# Patient Record
Sex: MALE | Race: WHITE | NOT HISPANIC OR LATINO | Employment: FULL TIME | URBAN - METROPOLITAN AREA
[De-identification: names, ages, dates, MRNs, and addresses within clinical notes are randomized per-mention and may not be internally consistent; named-entity substitution may affect disease eponyms.]

---

## 2024-05-01 ENCOUNTER — TELEPHONE (OUTPATIENT)
Dept: FAMILY MEDICINE CLINIC | Facility: CLINIC | Age: 44
End: 2024-05-01

## 2024-05-01 ENCOUNTER — OFFICE VISIT (OUTPATIENT)
Dept: FAMILY MEDICINE CLINIC | Facility: CLINIC | Age: 44
End: 2024-05-01
Payer: COMMERCIAL

## 2024-05-01 VITALS
BODY MASS INDEX: 31.62 KG/M2 | SYSTOLIC BLOOD PRESSURE: 111 MMHG | HEIGHT: 73 IN | WEIGHT: 238.6 LBS | DIASTOLIC BLOOD PRESSURE: 52 MMHG

## 2024-05-01 DIAGNOSIS — L91.8 SKIN TAG: ICD-10-CM

## 2024-05-01 DIAGNOSIS — F17.210 CIGARETTE NICOTINE DEPENDENCE WITHOUT COMPLICATION: ICD-10-CM

## 2024-05-01 DIAGNOSIS — F33.9 EPISODE OF RECURRENT MAJOR DEPRESSIVE DISORDER, UNSPECIFIED DEPRESSION EPISODE SEVERITY: Primary | ICD-10-CM

## 2024-05-01 PROCEDURE — 99204 OFFICE O/P NEW MOD 45 MIN: CPT | Performed by: NURSE PRACTITIONER

## 2024-05-01 RX ORDER — BUPROPION HYDROCHLORIDE 300 MG/1
300 TABLET ORAL EVERY MORNING
COMMUNITY
Start: 2024-01-20 | End: 2024-05-01

## 2024-05-01 RX ORDER — PAROXETINE HYDROCHLORIDE 20 MG/1
40 TABLET, FILM COATED ORAL
COMMUNITY
Start: 2024-01-20 | End: 2024-05-01

## 2024-05-01 NOTE — ASSESSMENT & PLAN NOTE
After some discussion we have decided to trial Trintellix, side effects and administration of medication discussed, we will follow-up in 1 month for reevaluation.  We are placing referral to psychiatry, we discussed potential increase in suicidal thoughts with medication at length, patient will call/seek help immediately if this were to occur

## 2024-05-01 NOTE — ASSESSMENT & PLAN NOTE
Patient is a smoker, does want to quit eventually, would like to get depression under control first.  He will work on cutting back

## 2024-05-01 NOTE — PROGRESS NOTES
"Chief Complaint  Establish Care and Depression    SUBJECTIVE  Isai Carlos presents to [facility] establish care, and talk about depression     Pt states depression has been life long,   Was seeing psych and doing therapy, has been doing therapy off and on since middle school, had a rough childhood.     Pt states had a very rough last year. Lost his job, was homeless, had a break-up, really had a bad year.  About 5  or so months ago he was placed on Wellbutrin and Paxil- but has been off for about a month, was on it for about 4 months. Wellbutrin was very expensive and paxil caused undesirable sexual side effects     Now living with family in [facility] and is working again. Situation is much improved, but depression persists.  Patient states that he does occasionally have thoughts of \" it would be easier if I were just dead\" but adamantly denies any active suicidal thoughts, intent to harm self, or any history of such.  Patient states he has been fighting his depression most of his life and he has had these fleeting thoughts of things being easier if he were dead off and on his entire life    Patient reports he has tried and failed Lexapro, welbutrin, paxil, zoloft, states unsure of other medications he may have tried.  Pt states was in a study at [facility] at one time for drug resistant depression, turned out to be in the placebo group.       Pt states over the last year he has been working on weight loss- total has lost about 80lbs, would like to lose about 20 more, trying to get healthy      Patient reports he also has a multitude of skin tags in bilateral axilla, would like referral to dermatology to have these removed as well as a head to toe skin check if he has never really had 1.      History of Present Illness  History reviewed. No pertinent past medical history.   History reviewed. No pertinent family history.   History reviewed. No pertinent surgical history. " "    Current Outpatient Medications:     Vortioxetine HBr (Trintellix) 10 MG tablet tablet, Take 1 tablet by mouth Daily With Breakfast., Disp: 30 tablet, Rfl: 1    OBJECTIVE  Vital Signs:   /52 (BP Location: Left arm, Patient Position: Sitting, Cuff Size: Large Adult)   Ht 185.4 cm (73\")   Wt 108 kg (238 lb 9.6 oz)   BMI 31.48 kg/m²    Estimated body mass index is 31.48 kg/m² as calculated from the following:    Height as of this encounter: 185.4 cm (73\").    Weight as of this encounter: 108 kg (238 lb 9.6 oz).     Wt Readings from Last 3 Encounters:   05/01/24 108 kg (238 lb 9.6 oz)     BP Readings from Last 3 Encounters:   05/01/24 111/52       Physical Exam  Vitals reviewed.   Constitutional:       Appearance: Normal appearance. He is well-developed.   HENT:      Head: Normocephalic and atraumatic.      Right Ear: External ear normal.      Left Ear: External ear normal.   Eyes:      Conjunctiva/sclera: Conjunctivae normal.      Pupils: Pupils are equal, round, and reactive to light.   Cardiovascular:      Rate and Rhythm: Normal rate and regular rhythm.      Heart sounds: No murmur heard.     No friction rub. No gallop.   Pulmonary:      Effort: Pulmonary effort is normal.      Breath sounds: Normal breath sounds. No wheezing or rhonchi.   Skin:     General: Skin is warm and dry.      Comments: Multiple tenderness skin tags noted under bilateral axilla ranging from 2 to 4 mm in size   Neurological:      Mental Status: He is alert and oriented to person, place, and time.      Cranial Nerves: No cranial nerve deficit.   Psychiatric:         Mood and Affect: Mood and affect normal.         Behavior: Behavior normal.         Thought Content: Thought content normal.         Judgment: Judgment normal.          Result Review                                  No Images in the past 120 days found..     The above data has been reviewed by DIANA Mckeon 05/01/2024 08:41 EDT.          Patient Care " Team:  Cally Trent APRN as PCP - General (Nurse Practitioner)    BMI cannot be calculated due to outdated height or weight values.  Please input a current height/weight in Vitals and re-renter BMIFOLLOWUP in Note to pull in correct documentation based on BMI range.       ASSESSMENT & PLAN    Diagnoses and all orders for this visit:    1. Episode of recurrent major depressive disorder, unspecified depression episode severity (Primary)  Assessment & Plan:  After some discussion we have decided to trial Trintellix, side effects and administration of medication discussed, we will follow-up in 1 month for reevaluation.  We are placing referral to psychiatry, we discussed potential increase in suicidal thoughts with medication at length, patient will call/seek help immediately if this were to occur    Orders:  -     Vortioxetine HBr (Trintellix) 10 MG tablet tablet; Take 1 tablet by mouth Daily With Breakfast.  Dispense: 30 tablet; Refill: 1  -     Ambulatory Referral to Psychiatry    2. Cigarette nicotine dependence without complication  Assessment & Plan:  Patient is a smoker, does want to quit eventually, would like to get depression under control first.  He will work on cutting back      3. Skin tag  -     Ambulatory Referral to Dermatology         Tobacco Use: Not on file       Follow Up     Return in 4 weeks (on 5/29/2024), or if symptoms worsen or fail to improve.        Patient was given instructions and counseling regarding his condition or for health maintenance advice. Please see specific information pulled into the AVS if appropriate.   I have reviewed information obtained and documented by others and I have confirmed the accuracy of this documented note.    DIANA Mckeon

## 2024-05-09 RX ORDER — VENLAFAXINE HYDROCHLORIDE 75 MG/1
75 CAPSULE, EXTENDED RELEASE ORAL DAILY
Qty: 30 CAPSULE | Refills: 1 | Status: SHIPPED | OUTPATIENT
Start: 2024-05-09

## 2024-06-04 ENCOUNTER — OFFICE VISIT (OUTPATIENT)
Dept: FAMILY MEDICINE CLINIC | Facility: CLINIC | Age: 44
End: 2024-06-04
Payer: COMMERCIAL

## 2024-06-04 VITALS
SYSTOLIC BLOOD PRESSURE: 124 MMHG | HEIGHT: 73 IN | OXYGEN SATURATION: 100 % | HEART RATE: 90 BPM | DIASTOLIC BLOOD PRESSURE: 63 MMHG | WEIGHT: 231 LBS | BODY MASS INDEX: 30.62 KG/M2

## 2024-06-04 DIAGNOSIS — Z13.220 LIPID SCREENING: ICD-10-CM

## 2024-06-04 DIAGNOSIS — E07.89 THYROID FULLNESS: ICD-10-CM

## 2024-06-04 DIAGNOSIS — Z00.00 ANNUAL PHYSICAL EXAM: Primary | ICD-10-CM

## 2024-06-04 DIAGNOSIS — F33.9 EPISODE OF RECURRENT MAJOR DEPRESSIVE DISORDER, UNSPECIFIED DEPRESSION EPISODE SEVERITY: ICD-10-CM

## 2024-06-04 DIAGNOSIS — F17.210 CIGARETTE NICOTINE DEPENDENCE WITHOUT COMPLICATION: ICD-10-CM

## 2024-06-04 DIAGNOSIS — E66.9 CLASS 1 OBESITY WITHOUT SERIOUS COMORBIDITY WITH BODY MASS INDEX (BMI) OF 30.0 TO 30.9 IN ADULT, UNSPECIFIED OBESITY TYPE: ICD-10-CM

## 2024-06-04 DIAGNOSIS — Z13.29 THYROID DISORDER SCREEN: ICD-10-CM

## 2024-06-04 PROBLEM — E66.811 CLASS 1 OBESITY WITHOUT SERIOUS COMORBIDITY WITH BODY MASS INDEX (BMI) OF 30.0 TO 30.9 IN ADULT: Status: ACTIVE | Noted: 2024-06-04

## 2024-06-04 PROCEDURE — 99214 OFFICE O/P EST MOD 30 MIN: CPT | Performed by: NURSE PRACTITIONER

## 2024-06-04 PROCEDURE — 99396 PREV VISIT EST AGE 40-64: CPT | Performed by: NURSE PRACTITIONER

## 2024-06-04 RX ORDER — VENLAFAXINE HYDROCHLORIDE 75 MG/1
75 CAPSULE, EXTENDED RELEASE ORAL DAILY
Qty: 30 CAPSULE | Refills: 1 | Status: SHIPPED | OUTPATIENT
Start: 2024-06-04

## 2024-06-04 NOTE — ASSESSMENT & PLAN NOTE
Patient's (Body mass index is 30.48 kg/m².) indicates that they are obese (BMI >30) with health conditions that include none . Weight is improving with lifestyle modifications. BMI  is above average; BMI management plan is completed. We discussed portion control and increasing exercise.  Patient continues to lose weight, his overall goal is to lose 100 pounds, has lost around 80 thus far, congratulated patient on his amazing success and encouraged him to continue to work on diet and exercise changes and slow steady weight loss.

## 2024-06-04 NOTE — PROGRESS NOTES
"Chief Complaint  Depression, Anxiety, and Annual Exam    SUBJECTIVE  Isai Carlos presents to [facility] for one month follow up on depression and annual exam. Trintellix was too expensive so pt was switched to Effexor. Pt states he is doing ok but has had increased anxiety with it.  States that he can see some improvement in his depression symptoms, however he has also noticed a few days where his anxiety was more bothersome than typical.  States overall he would like to continue the medication for the time being.    Pt is scheduled to see Behavorial Health at the end of this month.     Patient is still smoking    History of Present Illness  Past Medical History:   Diagnosis Date    Anxiety     Depression       History reviewed. No pertinent family history.   History reviewed. No pertinent surgical history.     Current Outpatient Medications:     venlafaxine XR (Effexor XR) 75 MG 24 hr capsule, Take 1 capsule by mouth Daily., Disp: 30 capsule, Rfl: 1    Cariprazine HCl (Vraylar) 1.5 MG capsule capsule, Take 1 capsule by mouth Daily., Disp: 30 capsule, Rfl: 1    OBJECTIVE  Vital Signs:   /63   Pulse 90   Ht 185.4 cm (73\")   Wt 105 kg (231 lb)   SpO2 100%   BMI 30.48 kg/m²    Estimated body mass index is 30.48 kg/m² as calculated from the following:    Height as of this encounter: 185.4 cm (73\").    Weight as of this encounter: 105 kg (231 lb).     Wt Readings from Last 3 Encounters:   06/04/24 105 kg (231 lb)   05/01/24 108 kg (238 lb 9.6 oz)     BP Readings from Last 3 Encounters:   06/04/24 124/63   05/01/24 111/52       Physical Exam  Vitals reviewed.   Constitutional:       General: He is not in acute distress.     Appearance: Normal appearance. He is not diaphoretic.   HENT:      Head: Normocephalic and atraumatic. Hair is normal.      Right Ear: Hearing, tympanic membrane, ear canal and external ear normal.      Left Ear: Hearing, tympanic membrane, ear canal " and external ear normal.      Nose: Nose normal. No nasal deformity.      Mouth/Throat:      Mouth: Mucous membranes are moist. No oral lesions.      Pharynx: Uvula midline. No uvula swelling.   Eyes:      General: Lids are normal. No scleral icterus.        Right eye: No discharge.         Left eye: No discharge.      Extraocular Movements: Extraocular movements intact.      Right eye: Normal extraocular motion and no nystagmus.      Left eye: Normal extraocular motion and no nystagmus.      Conjunctiva/sclera: Conjunctivae normal.      Pupils: Pupils are equal, round, and reactive to light.   Neck:      Thyroid: No thyromegaly.      Vascular: No JVD.      Comments: Thyroid fullness noted  Cardiovascular:      Rate and Rhythm: Normal rate and regular rhythm.      Pulses: Normal pulses.      Heart sounds: Normal heart sounds. No murmur heard.     No gallop.   Pulmonary:      Effort: Pulmonary effort is normal. No respiratory distress.      Breath sounds: Normal breath sounds. No wheezing or rales.   Chest:      Chest wall: No tenderness.   Abdominal:      General: Bowel sounds are normal. There is no distension.      Palpations: Abdomen is soft. There is no mass.      Tenderness: There is no abdominal tenderness. There is no guarding.      Hernia: No hernia is present.   Musculoskeletal:         General: No tenderness or deformity. Normal range of motion.      Cervical back: Normal range of motion and neck supple.   Lymphadenopathy:      Cervical: No cervical adenopathy.   Skin:     General: Skin is warm and dry.      Findings: No rash.   Neurological:      Mental Status: He is alert and oriented to person, place, and time.      Cranial Nerves: No cranial nerve deficit.      Coordination: Coordination normal.      Deep Tendon Reflexes: Reflexes are normal and symmetric. Reflexes normal.   Psychiatric:         Mood and Affect: Mood normal.         Behavior: Behavior normal.         Thought Content: Thought content  normal.         Judgment: Judgment normal.          Result Review                    No Images in the past 120 days found..     The above data has been reviewed by DIANA Mckeon 06/04/2024 10:45 EDT.          Patient Care Team:  Cally Trent APRN as PCP - General (Nurse Practitioner)    BMI is >= 30 and <35. (Class 1 Obesity). The following options were offered after discussion;: exercise counseling/recommendations and nutrition counseling/recommendations       ASSESSMENT & PLAN    Diagnoses and all orders for this visit:    1. Annual physical exam (Primary)  -     Comprehensive Metabolic Panel; Future  -     CBC & Differential; Future  -     Lipid Panel; Future  -     TSH Rfx On Abnormal To Free T4; Future    2. Cigarette nicotine dependence without complication  Assessment & Plan:  Discussed need to quit, patient is aware of it but not ready at this time      3. Thyroid disorder screen  -     TSH Rfx On Abnormal To Free T4; Future    4. Lipid screening  -     Lipid Panel; Future    5. Thyroid fullness  Comments:  No known history of thyroid problems  Orders:  -     US Thyroid; Future    6. Episode of recurrent major depressive disorder, unspecified depression episode severity  Assessment & Plan:  Improving with Effexor but not well-controlled at this time, after further discussion of potentially switching medications, increasing dose, or trying in addition, patient has opted to trial the addition of Vraylar, side effects administration of medication discussed, patient has appointment at the end of this month with psychiatry, he will follow-up with them at that time.  Discussed with patient that if he is unable to keep this appointment he will call to let me know so that we may follow-up here on the new medication.  Patient verbalized understanding.    Orders:  -     Cariprazine HCl (Vraylar) 1.5 MG capsule capsule; Take 1 capsule by mouth Daily.  Dispense: 30 capsule; Refill: 1  -     venlafaxine  XR (Effexor XR) 75 MG 24 hr capsule; Take 1 capsule by mouth Daily.  Dispense: 30 capsule; Refill: 1    7. Class 1 obesity without serious comorbidity with body mass index (BMI) of 30.0 to 30.9 in adult, unspecified obesity type  Assessment & Plan:  Patient's (Body mass index is 30.48 kg/m².) indicates that they are obese (BMI >30) with health conditions that include none . Weight is improving with lifestyle modifications. BMI  is above average; BMI management plan is completed. We discussed portion control and increasing exercise.  Patient continues to lose weight, his overall goal is to lose 100 pounds, has lost around 80 thus far, congratulated patient on his amazing success and encouraged him to continue to work on diet and exercise changes and slow steady weight loss.           Tobacco Use: High Risk (6/4/2024)    Patient History     Smoking Tobacco Use: Every Day     Smokeless Tobacco Use: Never     Passive Exposure: Not on file       The patient is advised to continue to work on diet and exercise changes and weight loss, continue current medications, continue current healthy lifestyle patterns, and return for routine annual checkups.    Follow Up     Return in about 3 months (around 9/4/2024), or if symptoms worsen or fail to improve.        Patient was given instructions and counseling regarding his condition or for health maintenance advice. Please see specific information pulled into the AVS if appropriate.   I have reviewed information obtained and documented by others and I have confirmed the accuracy of this documented note.    DIANA Mckeon

## 2024-06-04 NOTE — ASSESSMENT & PLAN NOTE
Improving with Effexor but not well-controlled at this time, after further discussion of potentially switching medications, increasing dose, or trying in addition, patient has opted to trial the addition of Vraylar, side effects administration of medication discussed, patient has appointment at the end of this month with psychiatry, he will follow-up with them at that time.  Discussed with patient that if he is unable to keep this appointment he will call to let me know so that we may follow-up here on the new medication.  Patient verbalized understanding.

## 2024-06-06 ENCOUNTER — LAB (OUTPATIENT)
Dept: LAB | Facility: HOSPITAL | Age: 44
End: 2024-06-06
Payer: COMMERCIAL

## 2024-06-06 DIAGNOSIS — Z13.29 THYROID DISORDER SCREEN: ICD-10-CM

## 2024-06-06 DIAGNOSIS — Z13.220 LIPID SCREENING: ICD-10-CM

## 2024-06-06 DIAGNOSIS — Z00.00 ANNUAL PHYSICAL EXAM: ICD-10-CM

## 2024-06-06 LAB
ALBUMIN SERPL-MCNC: 4.3 G/DL (ref 3.5–5.2)
ALBUMIN/GLOB SERPL: 1.6 G/DL
ALP SERPL-CCNC: 52 U/L (ref 39–117)
ALT SERPL W P-5'-P-CCNC: 20 U/L (ref 1–41)
ANION GAP SERPL CALCULATED.3IONS-SCNC: 9.8 MMOL/L (ref 5–15)
AST SERPL-CCNC: 18 U/L (ref 1–40)
BASOPHILS # BLD AUTO: 0.06 10*3/MM3 (ref 0–0.2)
BASOPHILS NFR BLD AUTO: 0.9 % (ref 0–1.5)
BILIRUB SERPL-MCNC: 0.6 MG/DL (ref 0–1.2)
BUN SERPL-MCNC: 11 MG/DL (ref 6–20)
BUN/CREAT SERPL: 12.2 (ref 7–25)
CALCIUM SPEC-SCNC: 9.4 MG/DL (ref 8.6–10.5)
CHLORIDE SERPL-SCNC: 103 MMOL/L (ref 98–107)
CHOLEST SERPL-MCNC: 176 MG/DL (ref 0–200)
CO2 SERPL-SCNC: 27.2 MMOL/L (ref 22–29)
CREAT SERPL-MCNC: 0.9 MG/DL (ref 0.76–1.27)
DEPRECATED RDW RBC AUTO: 43.3 FL (ref 37–54)
EGFRCR SERPLBLD CKD-EPI 2021: 108 ML/MIN/1.73
EOSINOPHIL # BLD AUTO: 0.35 10*3/MM3 (ref 0–0.4)
EOSINOPHIL NFR BLD AUTO: 5.1 % (ref 0.3–6.2)
ERYTHROCYTE [DISTWIDTH] IN BLOOD BY AUTOMATED COUNT: 12.5 % (ref 12.3–15.4)
GLOBULIN UR ELPH-MCNC: 2.7 GM/DL
GLUCOSE SERPL-MCNC: 110 MG/DL (ref 65–99)
HCT VFR BLD AUTO: 47.1 % (ref 37.5–51)
HDLC SERPL-MCNC: 41 MG/DL (ref 40–60)
HGB BLD-MCNC: 15.5 G/DL (ref 13–17.7)
IMM GRANULOCYTES # BLD AUTO: 0.02 10*3/MM3 (ref 0–0.05)
IMM GRANULOCYTES NFR BLD AUTO: 0.3 % (ref 0–0.5)
LDLC SERPL CALC-MCNC: 114 MG/DL (ref 0–100)
LDLC/HDLC SERPL: 2.73 {RATIO}
LYMPHOCYTES # BLD AUTO: 2.28 10*3/MM3 (ref 0.7–3.1)
LYMPHOCYTES NFR BLD AUTO: 33.3 % (ref 19.6–45.3)
MCH RBC QN AUTO: 30.8 PG (ref 26.6–33)
MCHC RBC AUTO-ENTMCNC: 32.9 G/DL (ref 31.5–35.7)
MCV RBC AUTO: 93.6 FL (ref 79–97)
MONOCYTES # BLD AUTO: 0.59 10*3/MM3 (ref 0.1–0.9)
MONOCYTES NFR BLD AUTO: 8.6 % (ref 5–12)
NEUTROPHILS NFR BLD AUTO: 3.54 10*3/MM3 (ref 1.7–7)
NEUTROPHILS NFR BLD AUTO: 51.8 % (ref 42.7–76)
NRBC BLD AUTO-RTO: 0 /100 WBC (ref 0–0.2)
PLATELET # BLD AUTO: 175 10*3/MM3 (ref 140–450)
PMV BLD AUTO: 12.1 FL (ref 6–12)
POTASSIUM SERPL-SCNC: 4.5 MMOL/L (ref 3.5–5.2)
PROT SERPL-MCNC: 7 G/DL (ref 6–8.5)
RBC # BLD AUTO: 5.03 10*6/MM3 (ref 4.14–5.8)
SODIUM SERPL-SCNC: 140 MMOL/L (ref 136–145)
TRIGL SERPL-MCNC: 115 MG/DL (ref 0–150)
TSH SERPL DL<=0.05 MIU/L-ACNC: 0.52 UIU/ML (ref 0.27–4.2)
VLDLC SERPL-MCNC: 21 MG/DL (ref 5–40)
WBC NRBC COR # BLD AUTO: 6.84 10*3/MM3 (ref 3.4–10.8)

## 2024-06-06 PROCEDURE — 80050 GENERAL HEALTH PANEL: CPT

## 2024-06-06 PROCEDURE — 80061 LIPID PANEL: CPT

## 2024-06-06 PROCEDURE — 36415 COLL VENOUS BLD VENIPUNCTURE: CPT

## 2024-06-10 DIAGNOSIS — R73.09 ELEVATED GLUCOSE: Primary | ICD-10-CM

## 2024-07-08 DIAGNOSIS — E07.89 THYROID FULLNESS: ICD-10-CM

## 2024-07-17 ENCOUNTER — OFFICE VISIT (OUTPATIENT)
Dept: PSYCHIATRY | Facility: CLINIC | Age: 44
End: 2024-07-17
Payer: COMMERCIAL

## 2024-07-17 VITALS
DIASTOLIC BLOOD PRESSURE: 70 MMHG | HEIGHT: 73 IN | HEART RATE: 72 BPM | SYSTOLIC BLOOD PRESSURE: 138 MMHG | WEIGHT: 231 LBS | BODY MASS INDEX: 30.62 KG/M2

## 2024-07-17 DIAGNOSIS — F43.10 POST TRAUMATIC STRESS DISORDER (PTSD): Primary | ICD-10-CM

## 2024-07-17 DIAGNOSIS — Z73.1 ACCENTUATION OF PERSONALITY TRAITS: ICD-10-CM

## 2024-07-17 DIAGNOSIS — F39 MOOD DISORDER: ICD-10-CM

## 2024-07-17 DIAGNOSIS — F41.0 PANIC ATTACK: ICD-10-CM

## 2024-07-17 DIAGNOSIS — F43.10 COMPLEX POSTTRAUMATIC STRESS DISORDER: ICD-10-CM

## 2024-07-17 RX ORDER — HYDROXYZINE HYDROCHLORIDE 10 MG/1
10 TABLET, FILM COATED ORAL 3 TIMES DAILY PRN
Qty: 90 TABLET | Refills: 1 | Status: SHIPPED | OUTPATIENT
Start: 2024-07-17 | End: 2024-10-15

## 2024-07-17 RX ORDER — LAMOTRIGINE 25 MG/1
TABLET ORAL
Qty: 140 TABLET | Refills: 0 | Status: SHIPPED | OUTPATIENT
Start: 2024-07-17 | End: 2024-09-11

## 2024-07-17 NOTE — TREATMENT PLAN
Multi-Disciplinary Problems (from Behavioral Health Treatment Plan)      Active Problems       Problem: Anxiety  Start Date: 07/17/24      Problem Details: The patient self-scales this problem as a 2 with 10 being the worst.          Goal Priority Start Date Expected End Date End Date    Patient will develop and implement behavioral and cognitive strategies to reduce anxiety and irrational fears. -- 07/17/24 -- --    Goal Details: Progress toward goal:  The patient self-scales their progress related to this goal as a 2 with 10 being the worst.          Goal Intervention Frequency Start Date End Date    Help patient explore past emotional issues in relation to present anxiety. Weekly 07/17/24 --    Intervention Details: Duration of treatment until remission of symptoms.          Goal Intervention Frequency Start Date End Date    Help patient develop an awareness of their cognitive and physical responses to anxiety. Weekly 07/17/24 --    Intervention Details: Duration of treatment until remission of symptoms.                          Reviewed By       Deneen Garduno APRN 07/17/24 1911                     I have discussed and reviewed this treatment plan with the patient.  It has been printed for signatures.

## 2024-07-17 NOTE — PROGRESS NOTES
"[facility]  Initial Evaluation    Referring Provider:  Cally Trent, APRN  [address]  [address]  [place] [address]    Chief Complaint: \"last year has been one of the worst years I've ever had\"    History of Present Illness: Isai Carlos is a 44 y.o. male who presents today for initial evaluation regarding psychiatric consultation. Isai presents unaccompanied in no acute distress and engages with me appropriately. Psychotropic regimen with which patient presents is described as cariprazine 1.5 mg po q day, and venlafaxine XR 75 mg po q day. He has not noticed any major changes in mood, cariprazine rather new.     History is positive for signs/symptoms suggestive of history of significant trauma ( tours [place]  and [place])for which there are related intrusion symptoms related to the traumatic event (distressing memories, flashbacks, nightmares, intense distress associated with triggering stimuli, marked physiological reactions to triggering stimuli), persistent avoidance of triggering stimuli, negative alterations in cognition and mood (memory lapses, negative schemas, distorted cognitions about the event, social withdrawal, feelings of detachment/estrangement, persistent anhedonia), and marked alterations in arousal and reactivity (irritability, reckless behavior, hypervigilance, exaggerated startle, sleep disturbances).   Isai has a history of early exposure to enduring trauma associated with re-experiencing trauma, avoidance, hyperarousal (PTSD) and difficulty managing emotions, negative self-view, relationship difficulties, dissociative symptoms, and demoralization (complex PTSD).     He has a hx of low mood, low energy, anhedonia, changes in sleep, changes in appetite, guilt, poor concentration, psychomotor changes, thoughts of being better off dead     Psychiatric screening is negative for pathognomonic history of: psychosis, vivian, " ".    I have counseled the patient with regard to diagnoses and the recommended treatment regimen as documented below: I will assume prescriptive responsibility for Vraylar 1.5 mg po q day, Venlafaxine XR 75 mg po q day, Lamotrigine 25 mg po ramp up, and hydroxyzine 10 mg po TID anxiety. We will begin lamotrigine for mood instability and have reviewed the titration schedule as well as the signs and risk of SJS in addition to other more common SE including dizziness, sedation, and benign rash.      I will begin hydroxyzine PRN anxiety; patient has been advised this medication can elicit sedation and dizziness and should not be taken prior to driving or operating machinery Patient acknowledges the diagnoses per my rendered interpretation. Patient demonstrates awareness/understanding of viable alternatives for treatment as well as potential risks, benefits, and side effects associated with this regimen and is amenable to proceed in this fashion.     Recommended lifestyle changes: 30 minutes of activity to increase HR 2-3 days weekly.    Psychiatric History:  Diagnoses: depression  Outpatient history: was in therapy but lost insurance   Inpatient history: none  Medication trials: Trintellix, bupropion, effexor, paroxetine, vraylar  Other treatment modalities: Psychotherapy  Self harm: no  Suicide attempts: none, thoughts-persistent passive Si, \"I think no one would notice if I was not here\"  Abuse or neglect: emotional    Substance Abuse History:   Types/methods/frequency: *heavy drinking in college  Transtheoretical stage: no issues   Occasional cannabis  Social History:  Residence: lives house with godfather and his family  Vocation: yes  Source of income: selling [employer]  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  Caffeine intake: half a pot of coffee  Hydration habits: no pertinent issues    history: Yes, two tours     Social History     Socioeconomic History    Marital status: Single   Tobacco Use    Smoking status: Every Day     Current packs/day: 1.00     Average packs/day: 1 pack/day for 23.5 years (23.5 ttl pk-yrs)     Types: Cigarettes     Start date: 2001    Smokeless tobacco: Never   Vaping Use    Vaping status: Never Used   Substance and Sexual Activity    Alcohol use: Not Currently    Drug use: Yes     Types: Marijuana    Sexual activity: Not Currently     Access to Firearms: no    Tobacco use counseling/intervention: patient was counseled with regard to risks of tobacco use and encouraged to consider quitting, with or without the use of adjunctive medication, by first setting a prospective quit date. Currently Precontemplation by transtheoretical model.     PHQ-9 Depression Screening  PHQ-9 Total Score: 17    Little interest or pleasure in doing things? 2-->more than half the days   Feeling down, depressed, or hopeless? 3-->nearly every day   Trouble falling or staying asleep, or sleeping too much? 1-->several days   Feeling tired or having little energy? 2-->more than half the days   Poor appetite or overeating? 1-->several days   Feeling bad about yourself - or that you are a failure or have let yourself or your family down? 3-->nearly every day   Trouble concentrating on things, such as reading the newspaper or watching television? 2-->more than half the days   Moving or speaking so slowly that other people could have noticed? Or the opposite - being so fidgety or restless that you have been moving around a lot more than usual? 1-->several days   Thoughts that you would be better off dead, or of hurting yourself in some way? 2-->more than half the days   PHQ-9 Total Score 17     BETH-7  Feeling nervous,  anxious or on edge: More than half the days  Not being able to stop or control worrying: More than half the days  Worrying too much about different things: More than half the days  Trouble Relaxing: Several days  Being so restless that it is hard to sit still: Several days  Feeling afraid as if something awful might happen: More than half the days  Becoming easily annoyed or irritable: Several days  BETH 7 Total Score: 11  If you checked any problems, how difficult have these problems made it for you to do your work, take care of things at home, or get along with other people: Somewhat difficult    Problem List:  Patient Active Problem List   Diagnosis    Cigarette nicotine dependence without complication    Episode of recurrent major depressive disorder    Class 1 obesity without serious comorbidity with body mass index (BMI) of 30.0 to 30.9 in adult     Allergy:   No Known Allergies     Discontinued Medications:  There are no discontinued medications.    Current Medications:   Current Outpatient Medications   Medication Sig Dispense Refill    Cariprazine HCl (Vraylar) 1.5 MG capsule capsule Take 1 capsule by mouth Daily. 30 capsule 1    venlafaxine XR (Effexor XR) 75 MG 24 hr capsule Take 1 capsule by mouth Daily. 30 capsule 1    hydrOXYzine (ATARAX) 10 MG tablet Take 1 tablet by mouth 3 (Three) Times a Day As Needed for Anxiety (take on tablet as needed for anxiety) for up to 90 days. 90 tablet 1    lamoTRIgine (LaMICtal) 25 MG tablet Take 1 tablet by mouth Every Night for 14 days, THEN 2 tablets Every Night for 14 days, THEN 3 tablets Every Night for 14 days, THEN 4 tablets Every Night for 14 days. Call office 2-3 days before you run out 140 tablet 0     No current facility-administered medications for this visit.     Past Medical History:  Past Medical History:   Diagnosis Date    Anxiety     Depression     Panic disorder      Past Surgical History:  Past Surgical History:   Procedure Laterality Date    NO PAST  "SURGERIES       Family History:   Family History   Problem Relation Age of Onset    Suicide Attempts Mother     Paranoid behavior Mother     Drug abuse Mother     Depression Mother     Bipolar disorder Mother     Alcohol abuse Mother     Depression Sister     Dementia Maternal Grandmother     ADD / ADHD Cousin     ADD / ADHD Other        Mental Status Exam:   Observations:  Appearance: Neat  Speech: Normal  Eye Contact: Avoidant  Motor Activity: Normal  Affect:Full  Comments:  Mood:Mood: Euthymic  Cognition  Orientation Impairment: None  Memory Impairment: None  Attention: Normal  Comments:  Perception  Hallucinations:None  Other:   Comments:  Thoughts:  Suicidality:None  Homicidality:None  Delusions:  None  Comments:  Behavior:Behavior: Cooperative  Insight: Insight: Good  Judgement:Insight: Good    Vital Signs:   /70 Comment: pt reports drinking coffee prior to appt  Pulse 72   Ht 185.4 cm (73\")   Wt 105 kg (231 lb)   BMI 30.48 kg/m²    Lab Results:   Lab on 06/06/2024   Component Date Value Ref Range Status    Glucose 06/06/2024 110 (H)  65 - 99 mg/dL Final    BUN 06/06/2024 11  6 - 20 mg/dL Final    Creatinine 06/06/2024 0.90  0.76 - 1.27 mg/dL Final    Sodium 06/06/2024 140  136 - 145 mmol/L Final    Potassium 06/06/2024 4.5  3.5 - 5.2 mmol/L Final    Chloride 06/06/2024 103  98 - 107 mmol/L Final    CO2 06/06/2024 27.2  22.0 - 29.0 mmol/L Final    Calcium 06/06/2024 9.4  8.6 - 10.5 mg/dL Final    Total Protein 06/06/2024 7.0  6.0 - 8.5 g/dL Final    Albumin 06/06/2024 4.3  3.5 - 5.2 g/dL Final    ALT (SGPT) 06/06/2024 20  1 - 41 U/L Final    AST (SGOT) 06/06/2024 18  1 - 40 U/L Final    Alkaline Phosphatase 06/06/2024 52  39 - 117 U/L Final    Total Bilirubin 06/06/2024 0.6  0.0 - 1.2 mg/dL Final    Globulin 06/06/2024 2.7  gm/dL Final    A/G Ratio 06/06/2024 1.6  g/dL Final    BUN/Creatinine Ratio 06/06/2024 12.2  7.0 - 25.0 Final    Anion Gap 06/06/2024 9.8  5.0 - 15.0 mmol/L Final    eGFR " 06/06/2024 108.0  >60.0 mL/min/1.73 Final    Total Cholesterol 06/06/2024 176  0 - 200 mg/dL Final    Triglycerides 06/06/2024 115  0 - 150 mg/dL Final    HDL Cholesterol 06/06/2024 41  40 - 60 mg/dL Final    LDL Cholesterol  06/06/2024 114 (H)  0 - 100 mg/dL Final    VLDL Cholesterol 06/06/2024 21  5 - 40 mg/dL Final    LDL/HDL Ratio 06/06/2024 2.73   Final    TSH 06/06/2024 0.518  0.270 - 4.200 uIU/mL Final    WBC 06/06/2024 6.84  3.40 - 10.80 10*3/mm3 Final    RBC 06/06/2024 5.03  4.14 - 5.80 10*6/mm3 Final    Hemoglobin 06/06/2024 15.5  13.0 - 17.7 g/dL Final    Hematocrit 06/06/2024 47.1  37.5 - 51.0 % Final    MCV 06/06/2024 93.6  79.0 - 97.0 fL Final    MCH 06/06/2024 30.8  26.6 - 33.0 pg Final    MCHC 06/06/2024 32.9  31.5 - 35.7 g/dL Final    RDW 06/06/2024 12.5  12.3 - 15.4 % Final    RDW-SD 06/06/2024 43.3  37.0 - 54.0 fl Final    MPV 06/06/2024 12.1 (H)  6.0 - 12.0 fL Final    Platelets 06/06/2024 175  140 - 450 10*3/mm3 Final    Neutrophil % 06/06/2024 51.8  42.7 - 76.0 % Final    Lymphocyte % 06/06/2024 33.3  19.6 - 45.3 % Final    Monocyte % 06/06/2024 8.6  5.0 - 12.0 % Final    Eosinophil % 06/06/2024 5.1  0.3 - 6.2 % Final    Basophil % 06/06/2024 0.9  0.0 - 1.5 % Final    Immature Grans % 06/06/2024 0.3  0.0 - 0.5 % Final    Neutrophils, Absolute 06/06/2024 3.54  1.70 - 7.00 10*3/mm3 Final    Lymphocytes, Absolute 06/06/2024 2.28  0.70 - 3.10 10*3/mm3 Final    Monocytes, Absolute 06/06/2024 0.59  0.10 - 0.90 10*3/mm3 Final    Eosinophils, Absolute 06/06/2024 0.35  0.00 - 0.40 10*3/mm3 Final    Basophils, Absolute 06/06/2024 0.06  0.00 - 0.20 10*3/mm3 Final    Immature Grans, Absolute 06/06/2024 0.02  0.00 - 0.05 10*3/mm3 Final    nRBC 06/06/2024 0.0  0.0 - 0.2 /100 WBC Final       ASSESSMENT AND PLAN:    ICD-10-CM ICD-9-CM   1. Post traumatic stress disorder (PTSD)  F43.10 309.81   2. Panic attack  F41.0 300.01   3. Mood disorder  F39 296.90   4. Accentuation of personality traits  Z73.1 V69.8   5.  Complex posttraumatic stress disorder  F43.10 309.81       Isai who presents today for initial evaluation regarding medication consultation. We have discussed the history and interpreted diagnoses as above as well as the treatment plan below, including potential R/B/SE of the recommended regimen of which the patient demonstrates understanding. Patient is agreeable to call 911 or go to the nearest ER should he become concerned for his own safety and/or the safety of those around him. There are not overt indices of acute vivian/psychosis on evaluation today.     Medication regimen: Begin lamotrigine 25 mg ramp-up, begin hydroxyzine 10 mg p.o. 3 times daily for panic anxiety, continue cariprazine 1.5 mg p.o. daily, continue venlafaxine XR 75 mg p.o. daily patient is advised not to misuse prescribed medications or to use any exogenous substances that aren't disclosed to this provider as they may interact with the regimen to his detriment.   Risk Assessment: protracted risk is severe, imminent risk is moderate to severe.  Risk factors include: C-PTSD and possible related personality traits- note that patients diagnosed with certain personality disorder (to be determined) carry a protracted risk of suicide - whether this outcome would be intentional or an accidental progression of what was intended to be a gesture.  At each visit I will assess the patient's appreciable imminent risk, bearing in mind their protracted risk. Today the patient's imminent risk is moderate relative to the protracted risk intrinsic to their diagnosis- and no further safety measures other than review of safety plan to call office, go to ER, or call [phone].  are warranted at this time. Do note that I cannot predict the onset of new/exacerbated stressors for this patient and that measure of imminent risk is thereby subject to change rapidly in this cohort. Other risk factors are  anxiety disorder, mood disorder, and recent/ongoing psychosocial  stressors. Protective factors include: patient's cooperation with care. Do note that this is subject to change with the Catholic of new stressors, treatment non-adherence, use of substances, and/or new medical ails.  Monitoring: reviewed labs/imaging as populated above,  PHQ-9 today is PHQ-9 Total Score: 17 /27, BETH-7 today is 11/21  Therapy: will look into Eap through [facility]   Follow-up: one month  Communications: N/A    TREATMENT PLAN/GOALS: challenge patterns of living conducive to symptom burden, implement recommended regimen as above with augmentative, intermittent supportive psychotherapy to reduce symptom burden. Patient acknowledged and verbally consented to begin treatment as above. The importance of adherence to the recommended treatment and interval follow-up appointments was emphasized today. Patient was today advised to limit daily caffeine intake, hydrate appropriately, eat healthy and nutritious foods, engage sleep hygiene measures, engage appropriate exposure to sunlight, engage with hobbies in balance with life necessities, and exercise appropriate to their capacity to do so.     Billing: I have seen the patient today and considered his psychiatric complaints, rendered a diagnosis, and discussed treatment with the patient as above with which he consents.    Parts of this note are electronic transcriptions/translations of spoken language to printed text using the Dragon Dictation system.    Electronically signed by DIANA Jensen, 07/17/24,

## 2024-07-18 DIAGNOSIS — E04.1 THYROID NODULE: Primary | ICD-10-CM

## 2024-08-28 ENCOUNTER — OFFICE VISIT (OUTPATIENT)
Dept: PSYCHIATRY | Facility: CLINIC | Age: 44
End: 2024-08-28
Payer: COMMERCIAL

## 2024-08-28 VITALS
SYSTOLIC BLOOD PRESSURE: 122 MMHG | BODY MASS INDEX: 31.73 KG/M2 | DIASTOLIC BLOOD PRESSURE: 73 MMHG | HEART RATE: 78 BPM | HEIGHT: 73 IN | WEIGHT: 239.4 LBS

## 2024-08-28 DIAGNOSIS — F41.0 PANIC ATTACK: ICD-10-CM

## 2024-08-28 DIAGNOSIS — F43.10 COMPLEX POSTTRAUMATIC STRESS DISORDER: ICD-10-CM

## 2024-08-28 DIAGNOSIS — F33.1 MODERATE EPISODE OF RECURRENT MAJOR DEPRESSIVE DISORDER: ICD-10-CM

## 2024-08-28 DIAGNOSIS — F43.10 POST TRAUMATIC STRESS DISORDER (PTSD): Primary | ICD-10-CM

## 2024-08-28 DIAGNOSIS — Z73.1 ACCENTUATION OF PERSONALITY TRAITS: ICD-10-CM

## 2024-08-28 DIAGNOSIS — F33.9 EPISODE OF RECURRENT MAJOR DEPRESSIVE DISORDER, UNSPECIFIED DEPRESSION EPISODE SEVERITY: ICD-10-CM

## 2024-08-28 NOTE — PROGRESS NOTES
"[facility]  Follow-up Visit    Chief Complaint: \"last year has been one of the worst years I've ever had\"     History of Present Illness: Isai Carlos is a 44 y.o. male who presents today for initial evaluation regarding psychiatric consultation. Isai presents unaccompanied in no acute distress and engages with me appropriately. Psychotropic regimen with which patient presents is described as cariprazine 1.5 mg po q day, and venlafaxine XR 75 mg po q day. He has not noticed any major changes in mood, cariprazine rather new.      History is positive for signs/symptoms suggestive of history of significant trauma ( tours [place]  and [place])for which there are related intrusion symptoms related to the traumatic event (distressing memories, flashbacks, nightmares, intense distress associated with triggering stimuli, marked physiological reactions to triggering stimuli), persistent avoidance of triggering stimuli, negative alterations in cognition and mood (memory lapses, negative schemas, distorted cognitions about the event, social withdrawal, feelings of detachment/estrangement, persistent anhedonia), and marked alterations in arousal and reactivity (irritability, reckless behavior, hypervigilance, exaggerated startle, sleep disturbances).   Isai has a history of early exposure to enduring trauma associated with re-experiencing trauma, avoidance, hyperarousal (PTSD) and difficulty managing emotions, negative self-view, relationship difficulties, dissociative symptoms, and demoralization (complex PTSD).      He has a hx of low mood, low energy, anhedonia, changes in sleep, changes in appetite, guilt, poor concentration, psychomotor changes, thoughts of being better off dead      Psychiatric screening is negative for pathognomonic history of: psychosis, vivian, .     I have counseled the patient with regard to diagnoses and the recommended treatment regimen as " "documented below: I will assume prescriptive responsibility for Vraylar 1.5 mg po q day, Venlafaxine XR 75 mg po q day, Lamotrigine 25 mg po ramp up, and hydroxyzine 10 mg po TID anxiety. We will begin lamotrigine for mood instability and have reviewed the titration schedule as well as the signs and risk of SJS in addition to other more common SE including dizziness, sedation, and benign rash.       I will begin hydroxyzine PRN anxiety; patient has been advised this medication can elicit sedation and dizziness and should not be taken prior to driving or operating machinery Patient acknowledges the diagnoses per my rendered interpretation. Patient demonstrates awareness/understanding of viable alternatives for treatment as well as potential risks, benefits, and side effects associated with this regimen and is amenable to proceed in this fashion.      Recommended lifestyle changes: 30 minutes of activity to increase HR 2-3 days weekly.     Psychiatric History:  Diagnoses: depression  Outpatient history: was in therapy but lost insurance   Inpatient history: none  Medication trials: Trintellix, bupropion, effexor, paroxetine, vraylar  Other treatment modalities: Psychotherapy  Self harm: no  Suicide attempts: none, thoughts-persistent passive Si, \"I think no one would notice if I was not here\"  Abuse or neglect: emotional     Substance Abuse History:   Types/methods/frequency: *heavy drinking in college  Transtheoretical stage: no issues   Occasional cannabis  Social History:  Residence: lives house with godfather and his family  Vocation: yes  Source of income: selling mattresses  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  Caffeine intake: half a pot of coffee  Hydration habits: no pertinent issues    history: Yes, two tours        Interval History Isai is a 44 y.o. male who presents today for follow-up  Isai presents unaccompanied in no acute distress and engages with me appropriately. Isai reports his medication to not be working.     Current treatment regimen includes:   -Lamotrigine 100 mg po q HS  -hydroxyzine 10 mg po q TID  Venlafaxine XR 75 mg po q day   Side-effects per given history: none reported      Today the patient feels frustrated. He desires a referral to the [facility].  Thought process and content are devoid of overt aberration suggestive of acute vivian/psychosis. The patient denies SI/HI/AVH. There are not changes on exam today compared to most recent evaluation.    - sleep: problematic  - appetite: moderately controlled    I have counseled the patient with regard to diagnoses and the recommended treatment regimen as documented below. Patient acknowledges the diagnoses per my rendered interpretation. Patient demonstrates understanding of potential risks/benefits/side effects associated with this regimen and is amenable to proceed in this fashion.     Assignment: begin journaling instances of overwhelm, response thereto, ideal response to cultivate insight and begin breaking a pattern of stimulus/response.      Social History     Socioeconomic History    Marital status: Single   Tobacco Use    Smoking status: Every Day     Current packs/day: 1.00     Average packs/day: 1 pack/day for 23.7 years (23.7 ttl pk-yrs)     Types: Cigarettes     Start date: 2001    Smokeless tobacco: Never   Vaping Use    Vaping status: Never Used   Substance and Sexual Activity    Alcohol use: Not Currently    Drug use: Yes     Types: Marijuana    Sexual activity: Not Currently       Tobacco use counseling/intervention: patient does not use tobacco.  Problem List:  Patient Active Problem List    Diagnosis    Cigarette nicotine dependence without complication    Episode of recurrent major depressive disorder    Class 1 obesity without serious comorbidity with body mass index (BMI) of 30.0 to 30.9 in adult     Allergy:   No Known Allergies     Discontinued Medications:  Medications Discontinued During This Encounter   Medication Reason    Cariprazine HCl (Vraylar) 1.5 MG capsule capsule Reorder       Current Medications:   Current Outpatient Medications   Medication Sig Dispense Refill    Cariprazine HCl (Vraylar) 1.5 MG capsule capsule Take 1 capsule by mouth Daily for 28 days. 28 capsule 0    hydrOXYzine (ATARAX) 10 MG tablet Take 1 tablet by mouth 3 (Three) Times a Day As Needed for Anxiety (take on tablet as needed for anxiety) for up to 90 days. 90 tablet 1    lamoTRIgine (LaMICtal) 25 MG tablet Take 1 tablet by mouth Every Night for 14 days, THEN 2 tablets Every Night for 14 days, THEN 3 tablets Every Night for 14 days, THEN 4 tablets Every Night for 14 days. Call office 2-3 days before you run out 140 tablet 0    venlafaxine XR (Effexor XR) 75 MG 24 hr capsule Take 1 capsule by mouth Daily. 30 capsule 1    Cariprazine HCl (VRAYLAR) 1.5 MG capsule capsule Take 1 capsule by mouth Daily for 90 days. 30 capsule 2     No current facility-administered medications for this visit.     Past Medical History:  Past Medical History:   Diagnosis Date    Anxiety     Depression     Panic disorder      Past Surgical History:  Past Surgical History:   Procedure Laterality Date    NO PAST SURGERIES         MENTAL STATUS EXAM   General Appearance:  Cleanly groomed and dressed and well developed  Eye Contact:  Good eye contact  Attitude:  Cooperative, polite and candid  Motor Activity:  Normal gait, posture  Muscle Strength:  Normal  Speech:  Normal rate, tone, volume  Language:  Spontaneous  Mood and affect:  Normal, pleasant  Hopelessness:  Denies  Loneliness: Denies  Thought Process:  Logical  Associations/ Thought  "Content:  No delusions  Hallucinations:  None  Suicidal Ideations:  Not present  Homicidal Ideation:  Not present  Sensorium:  Alert  Orientation:  Person, place, time and situation  Immediate Recall, Recent, and Remote Memory:  Intact  Attention Span/ Concentration:  Good  Fund of Knowledge:  Appropriate for age and educational level  Intellectual Functioning:  Average range  Insight:  Good  Judgement:  Good  Reliability:  Good  Impulse Control:  Good      Vital Signs:   /73   Pulse 78   Ht 185.4 cm (73\")   Wt 109 kg (239 lb 6.4 oz)   BMI 31.59 kg/m²    Lab Results:   Lab on 06/06/2024   Component Date Value Ref Range Status    Glucose 06/06/2024 110 (H)  65 - 99 mg/dL Final    BUN 06/06/2024 11  6 - 20 mg/dL Final    Creatinine 06/06/2024 0.90  0.76 - 1.27 mg/dL Final    Sodium 06/06/2024 140  136 - 145 mmol/L Final    Potassium 06/06/2024 4.5  3.5 - 5.2 mmol/L Final    Chloride 06/06/2024 103  98 - 107 mmol/L Final    CO2 06/06/2024 27.2  22.0 - 29.0 mmol/L Final    Calcium 06/06/2024 9.4  8.6 - 10.5 mg/dL Final    Total Protein 06/06/2024 7.0  6.0 - 8.5 g/dL Final    Albumin 06/06/2024 4.3  3.5 - 5.2 g/dL Final    ALT (SGPT) 06/06/2024 20  1 - 41 U/L Final    AST (SGOT) 06/06/2024 18  1 - 40 U/L Final    Alkaline Phosphatase 06/06/2024 52  39 - 117 U/L Final    Total Bilirubin 06/06/2024 0.6  0.0 - 1.2 mg/dL Final    Globulin 06/06/2024 2.7  gm/dL Final    A/G Ratio 06/06/2024 1.6  g/dL Final    BUN/Creatinine Ratio 06/06/2024 12.2  7.0 - 25.0 Final    Anion Gap 06/06/2024 9.8  5.0 - 15.0 mmol/L Final    eGFR 06/06/2024 108.0  >60.0 mL/min/1.73 Final    Total Cholesterol 06/06/2024 176  0 - 200 mg/dL Final    Triglycerides 06/06/2024 115  0 - 150 mg/dL Final    HDL Cholesterol 06/06/2024 41  40 - 60 mg/dL Final    LDL Cholesterol  06/06/2024 114 (H)  0 - 100 mg/dL Final    VLDL Cholesterol 06/06/2024 21  5 - 40 mg/dL Final    LDL/HDL Ratio 06/06/2024 2.73   Final    TSH 06/06/2024 0.518  0.270 - 4.200 " uIU/mL Final    WBC 06/06/2024 6.84  3.40 - 10.80 10*3/mm3 Final    RBC 06/06/2024 5.03  4.14 - 5.80 10*6/mm3 Final    Hemoglobin 06/06/2024 15.5  13.0 - 17.7 g/dL Final    Hematocrit 06/06/2024 47.1  37.5 - 51.0 % Final    MCV 06/06/2024 93.6  79.0 - 97.0 fL Final    MCH 06/06/2024 30.8  26.6 - 33.0 pg Final    MCHC 06/06/2024 32.9  31.5 - 35.7 g/dL Final    RDW 06/06/2024 12.5  12.3 - 15.4 % Final    RDW-SD 06/06/2024 43.3  37.0 - 54.0 fl Final    MPV 06/06/2024 12.1 (H)  6.0 - 12.0 fL Final    Platelets 06/06/2024 175  140 - 450 10*3/mm3 Final    Neutrophil % 06/06/2024 51.8  42.7 - 76.0 % Final    Lymphocyte % 06/06/2024 33.3  19.6 - 45.3 % Final    Monocyte % 06/06/2024 8.6  5.0 - 12.0 % Final    Eosinophil % 06/06/2024 5.1  0.3 - 6.2 % Final    Basophil % 06/06/2024 0.9  0.0 - 1.5 % Final    Immature Grans % 06/06/2024 0.3  0.0 - 0.5 % Final    Neutrophils, Absolute 06/06/2024 3.54  1.70 - 7.00 10*3/mm3 Final    Lymphocytes, Absolute 06/06/2024 2.28  0.70 - 3.10 10*3/mm3 Final    Monocytes, Absolute 06/06/2024 0.59  0.10 - 0.90 10*3/mm3 Final    Eosinophils, Absolute 06/06/2024 0.35  0.00 - 0.40 10*3/mm3 Final    Basophils, Absolute 06/06/2024 0.06  0.00 - 0.20 10*3/mm3 Final    Immature Grans, Absolute 06/06/2024 0.02  0.00 - 0.05 10*3/mm3 Final    nRBC 06/06/2024 0.0  0.0 - 0.2 /100 WBC Final       ASSESSMENT AND PLAN:    ICD-10-CM ICD-9-CM   1. Post traumatic stress disorder (PTSD)  F43.10 309.81   2. Panic attack  F41.0 300.01   3. Accentuation of personality traits  Z73.1 V69.8   4. Complex posttraumatic stress disorder  F43.10 309.81   5. Moderate episode of recurrent major depressive disorder  F33.1 296.32   6. Episode of recurrent major depressive disorder, unspecified depression episode severity  F33.9 296.30       Isai is a 44 y.o. male who presents today for follow-up regarding medication management. We have discussed the interval history and the treatment plan below, including potential R/B/SE of  the recommended regimen of which the patient demonstrates understanding. Patient is agreeable to call 911 or go to the nearest ER should he become concerned for his own safety and/or the safety of those around him. There are are no overt indices of acute vivian/psychosis on exam today. EMBER reviewed and is as expected.    Medication regimen: cariprazine 1.5 mg po q day, hydroxyzine 10 mg po TID, lamotrigine 100 mg po q HS, venlafaxine 75 mg po q day; patient is advised not to misuse prescribed medications or to use them with any exogenous substances that aren't disclosed to this provider as they may interact with the regimen to the patient's detriment.   Risk Assessment: protracted risk is moderate, imminent risk is moderate.  Do note that this is subject to change with the Yarsani of new stressors, treatment non-adherence, use of substances, and/or new medical ails.   Monitoring: reviewed labs/imaging as populated above; ordered  Therapy: sees provider through [facility]  Follow-up: one month  Communications: N/A    TREATMENT PLAN/GOALS: challenge patterns of living conducive to symptom burden, implement recommended regimen as above with augmentative, intermittent supportive psychotherapy to reduce symptom burden. Patient acknowledged and verbally consented to continue treatment. The importance of adherence to the recommended treatment and interval follow-up appointments was again emphasized today: patient has good treatment adherence per given history. Patient was today reminded to limit daily caffeine intake, hydrate appropriately, eat healthy and nutritious foods, engage sleep hygiene measures, engage appropriate exposure to sunlight, engage with hobbies in balance with life necessities, and exercise appropriate to their capacity to do so.     Parts of this note are electronic transcriptions/translations of spoken language to printed text using the Dragon Dictation system.    Electronically signed by Deneen Garduno  APRN, 08/28/24

## 2024-09-17 ENCOUNTER — TELEPHONE (OUTPATIENT)
Dept: PSYCHIATRY | Facility: CLINIC | Age: 44
End: 2024-09-17
Payer: COMMERCIAL

## 2024-09-19 DIAGNOSIS — F39 MOOD DISORDER: ICD-10-CM

## 2024-09-19 RX ORDER — LAMOTRIGINE 100 MG/1
100 TABLET ORAL DAILY
Qty: 30 TABLET | Refills: 2 | Status: SHIPPED | OUTPATIENT
Start: 2024-09-19 | End: 2024-12-18

## 2024-09-19 RX ORDER — LAMOTRIGINE 25 MG/1
TABLET ORAL
Qty: 140 TABLET | Refills: 0 | Status: CANCELLED | OUTPATIENT
Start: 2024-09-19 | End: 2024-11-13

## 2024-09-22 DIAGNOSIS — F33.9 EPISODE OF RECURRENT MAJOR DEPRESSIVE DISORDER, UNSPECIFIED DEPRESSION EPISODE SEVERITY: ICD-10-CM

## 2024-09-25 RX ORDER — VENLAFAXINE HYDROCHLORIDE 75 MG/1
75 CAPSULE, EXTENDED RELEASE ORAL DAILY
Qty: 30 CAPSULE | Refills: 0 | Status: SHIPPED | OUTPATIENT
Start: 2024-09-25

## 2024-10-29 ENCOUNTER — OFFICE VISIT (OUTPATIENT)
Dept: PSYCHIATRY | Facility: CLINIC | Age: 44
End: 2024-10-29
Payer: COMMERCIAL

## 2024-10-29 VITALS
DIASTOLIC BLOOD PRESSURE: 77 MMHG | HEIGHT: 73 IN | BODY MASS INDEX: 33.27 KG/M2 | HEART RATE: 72 BPM | SYSTOLIC BLOOD PRESSURE: 120 MMHG | WEIGHT: 251 LBS | OXYGEN SATURATION: 98 %

## 2024-10-29 DIAGNOSIS — F39 MOOD DISORDER: Primary | ICD-10-CM

## 2024-10-29 DIAGNOSIS — F41.0 PANIC ATTACK: ICD-10-CM

## 2024-10-29 DIAGNOSIS — F33.9 EPISODE OF RECURRENT MAJOR DEPRESSIVE DISORDER, UNSPECIFIED DEPRESSION EPISODE SEVERITY: ICD-10-CM

## 2024-10-29 DIAGNOSIS — F43.10 POST TRAUMATIC STRESS DISORDER (PTSD): ICD-10-CM

## 2024-10-29 PROCEDURE — 99214 OFFICE O/P EST MOD 30 MIN: CPT

## 2024-10-29 RX ORDER — VENLAFAXINE HYDROCHLORIDE 75 MG/1
75 CAPSULE, EXTENDED RELEASE ORAL DAILY
Qty: 30 CAPSULE | Refills: 2 | Status: SHIPPED | OUTPATIENT
Start: 2024-10-29 | End: 2025-01-27

## 2024-10-29 RX ORDER — LAMOTRIGINE 25 MG/1
TABLET ORAL
Qty: 42 TABLET | Refills: 0 | Status: SHIPPED | OUTPATIENT
Start: 2024-10-29 | End: 2024-11-26

## 2024-10-29 NOTE — PROGRESS NOTES
"[facility]  Follow-up Visit    Chief Complaint: \"last year has been one of the worst years I've ever had\"     History of Present Illness: Isai Carlos is a 44 y.o. male who presents today for initial evaluation regarding psychiatric consultation. Isai presents unaccompanied in no acute distress and engages with me appropriately. Psychotropic regimen with which patient presents is described as cariprazine 1.5 mg po q day, and venlafaxine XR 75 mg po q day. He has not noticed any major changes in mood, cariprazine rather new.      History is positive for signs/symptoms suggestive of history of significant trauma ( tours [place]  and [place])for which there are related intrusion symptoms related to the traumatic event (distressing memories, flashbacks, nightmares, intense distress associated with triggering stimuli, marked physiological reactions to triggering stimuli), persistent avoidance of triggering stimuli, negative alterations in cognition and mood (memory lapses, negative schemas, distorted cognitions about the event, social withdrawal, feelings of detachment/estrangement, persistent anhedonia), and marked alterations in arousal and reactivity (irritability, reckless behavior, hypervigilance, exaggerated startle, sleep disturbances).   Isai has a history of early exposure to enduring trauma associated with re-experiencing trauma, avoidance, hyperarousal (PTSD) and difficulty managing emotions, negative self-view, relationship difficulties, dissociative symptoms, and demoralization (complex PTSD).      He has a hx of low mood, low energy, anhedonia, changes in sleep, changes in appetite, guilt, poor concentration, psychomotor changes, thoughts of being better off dead      Psychiatric screening is negative for pathognomonic history of: psychosis, vivian, .     I have counseled the patient with regard to diagnoses and the recommended treatment regimen as " "documented below: I will assume prescriptive responsibility for Vraylar 1.5 mg po q day, Venlafaxine XR 75 mg po q day, Lamotrigine 25 mg po ramp up, and hydroxyzine 10 mg po TID anxiety. We will begin lamotrigine for mood instability and have reviewed the titration schedule as well as the signs and risk of SJS in addition to other more common SE including dizziness, sedation, and benign rash.       I will begin hydroxyzine PRN anxiety; patient has been advised this medication can elicit sedation and dizziness and should not be taken prior to driving or operating machinery Patient acknowledges the diagnoses per my rendered interpretation. Patient demonstrates awareness/understanding of viable alternatives for treatment as well as potential risks, benefits, and side effects associated with this regimen and is amenable to proceed in this fashion.      Recommended lifestyle changes: 30 minutes of activity to increase HR 2-3 days weekly.     Psychiatric History:  Diagnoses: depression  Outpatient history: was in therapy but lost insurance   Inpatient history: none  Medication trials: Trintellix, bupropion, effexor, paroxetine, vraylar  Other treatment modalities: Psychotherapy  Self harm: no  Suicide attempts: none, thoughts-persistent passive Si, \"I think no one would notice if I was not here\"  Abuse or neglect: emotional     Substance Abuse History:   Types/methods/frequency: *heavy drinking in college  Transtheoretical stage: no issues   Occasional cannabis  Social History:  Residence: lives house with godfather and his family  Vocation: yes  Source of income: selling mattresses  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  Caffeine intake: half a pot of coffee  Hydration habits: no pertinent issues    history: Yes, two tours        Interval History Isai is a 44 y.o. male who presents today for follow-up regarding medication management . Last seen: 8/28/2024 at which time we added cariprazine 1.5 mg po q day, hydroxyzine 10 mg po q . Isai presents unaccompanied in no acute distress and engages with me appropriately.     Current treatment regimen includes:   - cariprazine 1.5 mg po q HS  -venlafaxine 75 mg po q HS  Lamotrigine 100 mg q HS  Hydroxyzine 10 mg po TID    Side-effects per given history: none.      Today the patient feels \"okay\" does not endorse feeling better or worse on current regimen. He did voice getting in touch with [facility] about es ketamine treatments. States that they are waiting to hear from his insurance. Isai states that it will likely be next year before he sees any one for es ketamine as there is a stringent schedule and transportation is an issue. Thought process and content are devoid of overt aberration suggestive of acute vivian/psychosis. The patient denies SI/HI/AVH. There are not changes on exam today compared to most recent evaluation.    - sleep: no concerns  - appetite: moderately controlled  With Isai's consent, will increase cariprazine to 3 mg po q HS, and lamotrigine to 125 mg po q 14 nights, then increase to 150 mg po q HS until next follow up. These increases are in effort to target mood lability. I have counseled the patient with regard to diagnoses and the recommended treatment regimen as documented below. Patient acknowledges the diagnoses per my rendered interpretation. Patient demonstrates understanding of potential risks/benefits/side effects associated with this regimen and is amenable to proceed in this fashion.     Assignment: recommend patient begin walking regimen around his neighborhood. 10-15 minutes a day for 2-3 days a week. Then increase " to 15-30 minutes a day for 4-5 days a week.     Social History     Socioeconomic History    Marital status: Single   Tobacco Use    Smoking status: Every Day     Current packs/day: 1.00     Average packs/day: 1 pack/day for 23.8 years (23.8 ttl pk-yrs)     Types: Cigarettes     Start date: 2001    Smokeless tobacco: Never   Vaping Use    Vaping status: Never Used   Substance and Sexual Activity    Alcohol use: Not Currently    Drug use: Yes     Types: Marijuana    Sexual activity: Not Currently       Tobacco use counseling/intervention: patient has been counseled with regard to risks of tobacco use and encouraged to consider quitting, with or without the use of adjunctive medication. Currently Precontemplation by transtheoretical model.     Problem List:  Patient Active Problem List   Diagnosis    Cigarette nicotine dependence without complication    Episode of recurrent major depressive disorder    Class 1 obesity without serious comorbidity with body mass index (BMI) of 30.0 to 30.9 in adult     Allergy:   No Known Allergies     Discontinued Medications:  Medications Discontinued During This Encounter   Medication Reason    Cariprazine HCl (VRAYLAR) 1.5 MG capsule capsule Dose adjustment    venlafaxine XR (EFFEXOR-XR) 75 MG 24 hr capsule        Current Medications:   Current Outpatient Medications   Medication Sig Dispense Refill    venlafaxine XR (EFFEXOR-XR) 75 MG 24 hr capsule Take 1 capsule by mouth Daily for 90 days. 30 capsule 2    Cariprazine HCl (VRAYLAR) 3 MG capsule capsule Take 1 capsule by mouth Every Night for 90 days. 30 capsule 2    lamoTRIgine (LaMICtal) 100 MG tablet Take 1 tablet by mouth Daily for 90 days. 30 tablet 2    lamoTRIgine (LaMICtal) 25 MG tablet Take 1 tablet by mouth Every Night for 14 days, THEN 2 tablets Every Night for 14 days. Call for refill 42 tablet 0     No current facility-administered medications for this visit.     Past Medical History:  Past Medical History:   Diagnosis  "Date    Anxiety     Depression     Panic disorder      Past Surgical History:  Past Surgical History:   Procedure Laterality Date    NO PAST SURGERIES         MENTAL STATUS EXAM   General Appearance:  Well developed and unkempt  Eye Contact:  Good eye contact  Attitude:  Cooperative, polite and candid  Motor Activity:  Normal gait, posture  Muscle Strength:  Normal  Speech:  Normal rate, tone, volume  Mood and affect:  Flat  Hopelessness:  Denies  Loneliness: Denies  Thought Process:  Logical  Associations/ Thought Content:  No delusions  Hallucinations:  None  Suicidal Ideations:  Not present  Homicidal Ideation:  Not present  Sensorium:  Alert and clear  Orientation:  Person, place, time and situation  Immediate Recall, Recent, and Remote Memory:  Intact  Attention Span/ Concentration:  Good  Fund of Knowledge:  Appropriate for age and educational level  Intellectual Functioning:  Average range  Insight:  Good  Judgement:  Good  Reliability:  Good  Impulse Control:  Good      Vital Signs:   /77   Pulse 72   Ht 185.4 cm (73\")   Wt 114 kg (251 lb)   SpO2 98%   BMI 33.12 kg/m²    Lab Results:   Lab on 06/06/2024   Component Date Value Ref Range Status    Glucose 06/06/2024 110 (H)  65 - 99 mg/dL Final    BUN 06/06/2024 11  6 - 20 mg/dL Final    Creatinine 06/06/2024 0.90  0.76 - 1.27 mg/dL Final    Sodium 06/06/2024 140  136 - 145 mmol/L Final    Potassium 06/06/2024 4.5  3.5 - 5.2 mmol/L Final    Chloride 06/06/2024 103  98 - 107 mmol/L Final    CO2 06/06/2024 27.2  22.0 - 29.0 mmol/L Final    Calcium 06/06/2024 9.4  8.6 - 10.5 mg/dL Final    Total Protein 06/06/2024 7.0  6.0 - 8.5 g/dL Final    Albumin 06/06/2024 4.3  3.5 - 5.2 g/dL Final    ALT (SGPT) 06/06/2024 20  1 - 41 U/L Final    AST (SGOT) 06/06/2024 18  1 - 40 U/L Final    Alkaline Phosphatase 06/06/2024 52  39 - 117 U/L Final    Total Bilirubin 06/06/2024 0.6  0.0 - 1.2 mg/dL Final    Globulin 06/06/2024 2.7  gm/dL Final    A/G Ratio 06/06/2024 " 1.6  g/dL Final    BUN/Creatinine Ratio 06/06/2024 12.2  7.0 - 25.0 Final    Anion Gap 06/06/2024 9.8  5.0 - 15.0 mmol/L Final    eGFR 06/06/2024 108.0  >60.0 mL/min/1.73 Final    Total Cholesterol 06/06/2024 176  0 - 200 mg/dL Final    Triglycerides 06/06/2024 115  0 - 150 mg/dL Final    HDL Cholesterol 06/06/2024 41  40 - 60 mg/dL Final    LDL Cholesterol  06/06/2024 114 (H)  0 - 100 mg/dL Final    VLDL Cholesterol 06/06/2024 21  5 - 40 mg/dL Final    LDL/HDL Ratio 06/06/2024 2.73   Final    TSH 06/06/2024 0.518  0.270 - 4.200 uIU/mL Final    WBC 06/06/2024 6.84  3.40 - 10.80 10*3/mm3 Final    RBC 06/06/2024 5.03  4.14 - 5.80 10*6/mm3 Final    Hemoglobin 06/06/2024 15.5  13.0 - 17.7 g/dL Final    Hematocrit 06/06/2024 47.1  37.5 - 51.0 % Final    MCV 06/06/2024 93.6  79.0 - 97.0 fL Final    MCH 06/06/2024 30.8  26.6 - 33.0 pg Final    MCHC 06/06/2024 32.9  31.5 - 35.7 g/dL Final    RDW 06/06/2024 12.5  12.3 - 15.4 % Final    RDW-SD 06/06/2024 43.3  37.0 - 54.0 fl Final    MPV 06/06/2024 12.1 (H)  6.0 - 12.0 fL Final    Platelets 06/06/2024 175  140 - 450 10*3/mm3 Final    Neutrophil % 06/06/2024 51.8  42.7 - 76.0 % Final    Lymphocyte % 06/06/2024 33.3  19.6 - 45.3 % Final    Monocyte % 06/06/2024 8.6  5.0 - 12.0 % Final    Eosinophil % 06/06/2024 5.1  0.3 - 6.2 % Final    Basophil % 06/06/2024 0.9  0.0 - 1.5 % Final    Immature Grans % 06/06/2024 0.3  0.0 - 0.5 % Final    Neutrophils, Absolute 06/06/2024 3.54  1.70 - 7.00 10*3/mm3 Final    Lymphocytes, Absolute 06/06/2024 2.28  0.70 - 3.10 10*3/mm3 Final    Monocytes, Absolute 06/06/2024 0.59  0.10 - 0.90 10*3/mm3 Final    Eosinophils, Absolute 06/06/2024 0.35  0.00 - 0.40 10*3/mm3 Final    Basophils, Absolute 06/06/2024 0.06  0.00 - 0.20 10*3/mm3 Final    Immature Grans, Absolute 06/06/2024 0.02  0.00 - 0.05 10*3/mm3 Final    nRBC 06/06/2024 0.0  0.0 - 0.2 /100 WBC Final       ASSESSMENT AND PLAN:    ICD-10-CM ICD-9-CM   1. Mood disorder  F39 296.90   2. Post  traumatic stress disorder (PTSD)  F43.10 309.81   3. Panic attack  F41.0 300.01   4. Episode of recurrent major depressive disorder, unspecified depression episode severity  F33.9 296.30       Isai is a 44 y.o. male who presents today for follow-up regarding medication management . We have discussed the interval history and the treatment plan below, including potential R/B/SE of the recommended regimen of which the patient demonstrates understanding. Patient is agreeable to call 911 or go to the nearest ER should he become concerned for his own safety and/or the safety of those around him. There are are no overt indices of acute vivian/psychosis on exam today. EMBER reviewed and is as expected.    Medication regimen: increase cariprazine to 3 mg po q HS, increase lamotrigine to 125 mg po q HS for 14 nights then increase to 150 mg po q HS, call for refill, continue venlafaxine 75 mg po q HS, continue hydroxyzine 10 mg po q TID prn; patient is advised not to misuse prescribed medications or to use them with any exogenous substances that aren't disclosed to this provider as they may interact with the regimen to the patient's detriment.   Risk Assessment: protracted risk is moderate, imminent risk is moderate. Do note that this is subject to change with the Rastafari of new stressors, treatment non-adherence, use of substances, and/or new medical ails.   Monitoring: reviewed labs/imaging as populated above; ordered  Therapy: will use EAP from employer  Follow-up: 3 months  Communications: patient to follow up with [facility] to discuss options for es ketamine treatment    TREATMENT PLAN/GOALS: challenge patterns of living conducive to symptom burden, implement recommended regimen as above with augmentative, intermittent supportive psychotherapy to reduce symptom burden. Patient acknowledged and verbally consented to continue treatment. The importance of adherence to the recommended treatment and interval follow-up  appointments was again emphasized today: patient has good treatment adherence per given history. Patient was today reminded to limit daily caffeine intake, hydrate appropriately, eat healthy and nutritious foods, engage sleep hygiene measures, engage appropriate exposure to sunlight, engage with hobbies in balance with life necessities, and exercise appropriate to their capacity to do so.         Parts of this note are electronic transcriptions/translations of spoken language to printed text using the Dragon Dictation system.    Electronically signed by DIANA Jensen, 10/29/24

## 2024-11-27 DIAGNOSIS — F39 MOOD DISORDER: ICD-10-CM

## 2024-11-27 RX ORDER — LAMOTRIGINE 150 MG/1
150 TABLET ORAL NIGHTLY
Qty: 30 TABLET | Refills: 0 | Status: SHIPPED | OUTPATIENT
Start: 2024-11-27

## 2024-11-27 RX ORDER — LAMOTRIGINE 100 MG/1
100 TABLET ORAL DAILY
Qty: 30 TABLET | Refills: 2 | Status: CANCELLED | OUTPATIENT
Start: 2024-11-27 | End: 2025-02-25

## 2024-11-27 NOTE — TELEPHONE ENCOUNTER
NEXT VISIT WITH PROVIDER   Appointment with Deneen Garduno APRN (01/28/2025)     LAST SEEN BY PROVIDER   Office Visit with Deneen Garduno APRN (10/29/2024)     LAST MED REFILL  lamoTRIgine (LaMICtal) 100 MG tablet (09/19/2024)  lamoTRIgine (LaMICtal) 25 MG tablet (10/29/2024)    PROVIDER PLEASE ADVISE

## 2024-12-29 DIAGNOSIS — F39 MOOD DISORDER: ICD-10-CM

## 2024-12-30 RX ORDER — LAMOTRIGINE 150 MG/1
150 TABLET ORAL NIGHTLY
Qty: 30 TABLET | Refills: 0 | Status: SHIPPED | OUTPATIENT
Start: 2024-12-30

## 2024-12-30 NOTE — TELEPHONE ENCOUNTER
NEXT VISIT WITH PROVIDER   Appointment with Deneen Garduno APRN (01/28/2025)     LAST SEEN BY PROVIDER   Office Visit with Deneen Garduno APRN (10/29/2024)     LAST MED REFILL  lamoTRIgine (LaMICtal) 150 MG tablet (11/27/2024)     PROVIDER PLEASE ADVISE

## 2025-01-27 NOTE — PROGRESS NOTES
"[facility]  Follow-up Visit    Chief Complaint: \"last year has been one of the worst years I've ever had\"     History of Present Illness: Isai Carlos is a 44 y.o. male who presents today for initial evaluation regarding psychiatric consultation. Iasi presents unaccompanied in no acute distress and engages with me appropriately. Psychotropic regimen with which patient presents is described as cariprazine 1.5 mg po q day, and venlafaxine XR 75 mg po q day. He has not noticed any major changes in mood, cariprazine rather new.      History is positive for signs/symptoms suggestive of history of significant trauma ( tours [place]  and [place])for which there are related intrusion symptoms related to the traumatic event (distressing memories, flashbacks, nightmares, intense distress associated with triggering stimuli, marked physiological reactions to triggering stimuli), persistent avoidance of triggering stimuli, negative alterations in cognition and mood (memory lapses, negative schemas, distorted cognitions about the event, social withdrawal, feelings of detachment/estrangement, persistent anhedonia), and marked alterations in arousal and reactivity (irritability, reckless behavior, hypervigilance, exaggerated startle, sleep disturbances).   Isai has a history of early exposure to enduring trauma associated with re-experiencing trauma, avoidance, hyperarousal (PTSD) and difficulty managing emotions, negative self-view, relationship difficulties, dissociative symptoms, and demoralization (complex PTSD).      He has a hx of low mood, low energy, anhedonia, changes in sleep, changes in appetite, guilt, poor concentration, psychomotor changes, thoughts of being better off dead      Psychiatric screening is negative for pathognomonic history of: psychosis, vivian.      I have counseled the patient with regard to diagnoses and the recommended treatment regimen as " "documented below: I will assume prescriptive responsibility for Vraylar 1.5 mg po q day, Venlafaxine XR 75 mg po q day, Lamotrigine 25 mg po ramp up, and hydroxyzine 10 mg po TID anxiety. We will begin lamotrigine for mood instability and have reviewed the titration schedule as well as the signs and risk of SJS in addition to other more common SE including dizziness, sedation, and benign rash.       I will begin hydroxyzine PRN anxiety; patient has been advised this medication can elicit sedation and dizziness and should not be taken prior to driving or operating machinery Patient acknowledges the diagnoses per my rendered interpretation. Patient demonstrates awareness/understanding of viable alternatives for treatment as well as potential risks, benefits, and side effects associated with this regimen and is amenable to proceed in this fashion.      Recommended lifestyle changes: 30 minutes of activity to increase HR 2-3 days weekly.     Psychiatric History:  Diagnoses: depression  Outpatient history: was in therapy but lost insurance   Inpatient history: none  Medication trials: never started-Trintellix, bupropion, effexor, paroxetine, vraylar  Other treatment modalities: Psychotherapy  Self harm: no  Suicide attempts: none, thoughts-persistent passive Si, \"I think no one would notice if I was not here\"  Abuse or neglect: emotional     Substance Abuse History:   Types/methods/frequency: *heavy drinking in college  Transtheoretical stage: no issues   Occasional cannabis  Social History:  Residence: lives house with godfather and his family  Vocation: yes  Source of income: selling [employer]  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  Caffeine intake: half a pot of coffee  Hydration habits: no pertinent issues    history: Yes, two tours        Interval History Isai is a guarded 44 y.o. male who presents today for follow-up. Isai presents unaccompanied in no acute distress and engages with me appropriately.   Current treatment regimen includes:   -Cariprazine 3 mg p.o. nightly  Lamotrigine 150 mg p.o. nightly  Venlafaxine 75 mg p.o. nightly  Hydroxyzine 10 mg p.o. 3 times daily as needed anxiety  Side-effects per given history: none.      Today the patient feels frustrated. He voices that is not getting much benefit out of the medication and his health insurance will not cover Spravato for MDD. Uses marijuana a few times a week. In regard to risk, patient verbalizes that he has passive suicidal ideation with no plan, means, or intent. Patient is future oriented and makes follow up appointment, agrees to participate in care. He agrees to call 911/go to nearest ER for evaluation for safety and stabilization if SI intensifies.     Thought process and content are devoid of overt aberration suggestive of acute vivian/psychosis. The patient denies denies active SI/HI/AVH. There are changes on exam today compared to most recent evaluation.     - sleep: no concerns  - appetite: moderately controlled  Recommend ceasing marijuana for now as we adjust medications. Recommend therapy to gain coping skills.   Will start weaning off of venlafaxine, ineffective-worsened anxiety. Reduce venlafaxine dose to 37.5 mg po q day for two weeks, then take one tab daily every other day for 7 days, then take one dose daily for every 2 days then stop. Close follow up. Continue lamotrigine, continue Vraylar. [facility]erred to [facility] for in person therapy.   I have counseled the patient with regard to diagnoses and the recommended treatment regimen as documented below. Patient acknowledges the diagnoses per my rendered interpretation. Patient  demonstrates understanding of potential risks/benefits/side effects associated with this regimen and is amenable to proceed in this fashion.       Social History     Socioeconomic History    Marital status: Single   Tobacco Use    Smoking status: Every Day     Current packs/day: 1.00     Average packs/day: 1 pack/day for 24.1 years (24.1 ttl pk-yrs)     Types: Cigarettes     Start date: 2001    Smokeless tobacco: Never   Vaping Use    Vaping status: Never Used   Substance and Sexual Activity    Alcohol use: Not Currently    Drug use: Yes     Types: Marijuana    Sexual activity: Not Currently       Tobacco use counseling/intervention: patient has been counseled with regard to risks of tobacco use and encouraged to consider quitting, with or without the use of adjunctive medication. Currently Precontemplation by transtheoretical model.     Problem List:  Patient Active Problem List   Diagnosis    Cigarette nicotine dependence without complication    Episode of recurrent major depressive disorder    Class 1 obesity without serious comorbidity with body mass index (BMI) of 30.0 to 30.9 in adult     Allergy:   No Known Allergies     Discontinued Medications:  Medications Discontinued During This Encounter   Medication Reason    venlafaxine XR (EFFEXOR-XR) 75 MG 24 hr capsule Dose adjustment    lamoTRIgine (LaMICtal) 150 MG tablet Reorder       Current Medications:   Current Outpatient Medications   Medication Sig Dispense Refill    Cariprazine HCl (VRAYLAR) 3 MG capsule capsule Take 1 capsule by mouth Every Night for 90 days. 30 capsule 2    lamoTRIgine (LaMICtal) 150 MG tablet Take 1 tablet by mouth Every Night. 30 tablet 0    Cariprazine HCl (VRAYLAR) 3 MG capsule capsule Take 1 capsule by mouth Daily for 28 days. 28 capsule 0    venlafaxine (EFFEXOR) 37.5 MG tablet Take daily for two weeks, then take one dose po  every other day for 7 days, then take one dose every two days then STOP medication 30 tablet 0     No  "current facility-administered medications for this visit.     Past Medical History:  Past Medical History:   Diagnosis Date    Anxiety     Depression     Panic disorder      Past Surgical History:  Past Surgical History:   Procedure Laterality Date    NO PAST SURGERIES         MENTAL STATUS EXAM   General Appearance:  Cleanly groomed and dressed and well developed  Eye Contact:  Good eye contact and downcast  Attitude:  Guarded  Motor Activity:  Normal gait, posture  Muscle Strength:  Normal  Speech:  Normal rate, tone, volume  Language:  Spontaneous  Mood and affect:  Flat  Hopelessness:  1  Thought Process:  Linear  Associations/ Thought Content:  No delusions  Hallucinations:  None  Suicidal Ideations:  Passive ideation  Homicidal Ideation:  Not present  Sensorium:  Alert and clear  Orientation:  Person, place, time and situation  Immediate Recall, Recent, and Remote Memory:  Intact  Attention Span/ Concentration:  Good  Fund of Knowledge:  Appropriate for age and educational level  Intellectual Functioning:  Average range  Insight:  Good  Judgement:  Good  Reliability:  Good  Impulse Control:  Good      Vital Signs:   /67   Pulse 85   Ht 185.4 cm (73\")   Wt 110 kg (242 lb)   BMI 31.93 kg/m²    Lab Results:   No visits with results within 6 Month(s) from this visit.   Latest known visit with results is:   Lab on 06/06/2024   Component Date Value Ref Range Status    Glucose 06/06/2024 110 (H)  65 - 99 mg/dL Final    BUN 06/06/2024 11  6 - 20 mg/dL Final    Creatinine 06/06/2024 0.90  0.76 - 1.27 mg/dL Final    Sodium 06/06/2024 140  136 - 145 mmol/L Final    Potassium 06/06/2024 4.5  3.5 - 5.2 mmol/L Final    Chloride 06/06/2024 103  98 - 107 mmol/L Final    CO2 06/06/2024 27.2  22.0 - 29.0 mmol/L Final    Calcium 06/06/2024 9.4  8.6 - 10.5 mg/dL Final    Total Protein 06/06/2024 7.0  6.0 - 8.5 g/dL Final    Albumin 06/06/2024 4.3  3.5 - 5.2 g/dL Final    ALT (SGPT) 06/06/2024 20  1 - 41 U/L Final    AST " (SGOT) 06/06/2024 18  1 - 40 U/L Final    Alkaline Phosphatase 06/06/2024 52  39 - 117 U/L Final    Total Bilirubin 06/06/2024 0.6  0.0 - 1.2 mg/dL Final    Globulin 06/06/2024 2.7  gm/dL Final    A/G Ratio 06/06/2024 1.6  g/dL Final    BUN/Creatinine Ratio 06/06/2024 12.2  7.0 - 25.0 Final    Anion Gap 06/06/2024 9.8  5.0 - 15.0 mmol/L Final    eGFR 06/06/2024 108.0  >60.0 mL/min/1.73 Final    Total Cholesterol 06/06/2024 176  0 - 200 mg/dL Final    Triglycerides 06/06/2024 115  0 - 150 mg/dL Final    HDL Cholesterol 06/06/2024 41  40 - 60 mg/dL Final    LDL Cholesterol  06/06/2024 114 (H)  0 - 100 mg/dL Final    VLDL Cholesterol 06/06/2024 21  5 - 40 mg/dL Final    LDL/HDL Ratio 06/06/2024 2.73   Final    TSH 06/06/2024 0.518  0.270 - 4.200 uIU/mL Final    WBC 06/06/2024 6.84  3.40 - 10.80 10*3/mm3 Final    RBC 06/06/2024 5.03  4.14 - 5.80 10*6/mm3 Final    Hemoglobin 06/06/2024 15.5  13.0 - 17.7 g/dL Final    Hematocrit 06/06/2024 47.1  37.5 - 51.0 % Final    MCV 06/06/2024 93.6  79.0 - 97.0 fL Final    MCH 06/06/2024 30.8  26.6 - 33.0 pg Final    MCHC 06/06/2024 32.9  31.5 - 35.7 g/dL Final    RDW 06/06/2024 12.5  12.3 - 15.4 % Final    RDW-SD 06/06/2024 43.3  37.0 - 54.0 fl Final    MPV 06/06/2024 12.1 (H)  6.0 - 12.0 fL Final    Platelets 06/06/2024 175  140 - 450 10*3/mm3 Final    Neutrophil % 06/06/2024 51.8  42.7 - 76.0 % Final    Lymphocyte % 06/06/2024 33.3  19.6 - 45.3 % Final    Monocyte % 06/06/2024 8.6  5.0 - 12.0 % Final    Eosinophil % 06/06/2024 5.1  0.3 - 6.2 % Final    Basophil % 06/06/2024 0.9  0.0 - 1.5 % Final    Immature Grans % 06/06/2024 0.3  0.0 - 0.5 % Final    Neutrophils, Absolute 06/06/2024 3.54  1.70 - 7.00 10*3/mm3 Final    Lymphocytes, Absolute 06/06/2024 2.28  0.70 - 3.10 10*3/mm3 Final    Monocytes, Absolute 06/06/2024 0.59  0.10 - 0.90 10*3/mm3 Final    Eosinophils, Absolute 06/06/2024 0.35  0.00 - 0.40 10*3/mm3 Final    Basophils, Absolute 06/06/2024 0.06  0.00 - 0.20 10*3/mm3  Final    Immature Grans, Absolute 06/06/2024 0.02  0.00 - 0.05 10*3/mm3 Final    nRBC 06/06/2024 0.0  0.0 - 0.2 /100 WBC Final       ASSESSMENT AND PLAN:    ICD-10-CM ICD-9-CM   1. Mood disorder  F39 296.90   2. Post traumatic stress disorder (PTSD)  F43.10 309.81   3. Accentuation of personality traits  Z73.1 V69.8   4. Complex posttraumatic stress disorder  F43.10 309.81   5. Moderate episode of recurrent major depressive disorder  F33.1 296.32       Isai is a 44 y.o. male who presents today for follow-up regarding medication check. We have discussed the interval history and the treatment plan below, including potential R/B/SE of the recommended regimen of which the patient demonstrates understanding. Patient is agreeable to call 911 or go to the nearest ER should he become concerned for his own safety and/or the safety of those around him. There are are no overt indices of acute vivian/psychosis on exam today. EMBER reviewed and is as expected.    Medication regimen: begin taper of venlafaxine-37.5 mg po q day for two weeks, then take one dose daily every other day for 7 days, then take one dose po every 2 days then STOP, continue lamotrigine 150 mg po q HS, and continue Vraylar 3 mg po q day. The patient is advised not to misuse prescribed medications or to use them with any exogenous substances that aren't disclosed to this provider as they may interact with the regimen to the patient's detriment.   Risk Assessment: protracted risk is moderate, imminent risk is moderate.  Do note that this is subject to change with the Faith of new stressors, treatment non-adherence, use of substances, and/or new medical ails.   Monitoring: reviewed labs/imaging as populated above; ordered  Therapy: referred to [facility]   Follow-up: 2/10/2025  Communications: call [facility] in 3 weeks if you have not heard from their office     TREATMENT PLAN/GOALS: challenge patterns of living conducive to symptom burden, implement  recommended regimen as above with augmentative, intermittent supportive psychotherapy to reduce symptom burden. Patient acknowledged and verbally consented to continue treatment. The importance of adherence to the recommended treatment and interval follow-up appointments was again emphasized today: patient has fair treatment adherence per given history. Patient was today reminded to limit daily caffeine intake, hydrate appropriately, eat healthy and nutritious foods, engage sleep hygiene measures, engage appropriate exposure to sunlight, engage with hobbies in balance with life necessities, and exercise appropriate to their capacity to do so.       Parts of this note are electronic transcriptions/translations of spoken language to printed text using the Dragon Dictation system.    Electronically signed by DIANA Jensen, 01/27/25

## 2025-01-28 ENCOUNTER — OFFICE VISIT (OUTPATIENT)
Dept: PSYCHIATRY | Facility: CLINIC | Age: 45
End: 2025-01-28
Payer: COMMERCIAL

## 2025-01-28 VITALS
BODY MASS INDEX: 32.07 KG/M2 | WEIGHT: 242 LBS | HEART RATE: 85 BPM | SYSTOLIC BLOOD PRESSURE: 106 MMHG | HEIGHT: 73 IN | DIASTOLIC BLOOD PRESSURE: 67 MMHG

## 2025-01-28 DIAGNOSIS — F43.10 POST TRAUMATIC STRESS DISORDER (PTSD): ICD-10-CM

## 2025-01-28 DIAGNOSIS — F33.1 MODERATE EPISODE OF RECURRENT MAJOR DEPRESSIVE DISORDER: ICD-10-CM

## 2025-01-28 DIAGNOSIS — F43.10 COMPLEX POSTTRAUMATIC STRESS DISORDER: ICD-10-CM

## 2025-01-28 DIAGNOSIS — F39 MOOD DISORDER: Primary | ICD-10-CM

## 2025-01-28 DIAGNOSIS — Z73.1 ACCENTUATION OF PERSONALITY TRAITS: ICD-10-CM

## 2025-01-28 PROCEDURE — 99214 OFFICE O/P EST MOD 30 MIN: CPT

## 2025-01-28 RX ORDER — VENLAFAXINE 37.5 MG/1
TABLET ORAL
Qty: 30 TABLET | Refills: 0 | Status: SHIPPED | OUTPATIENT
Start: 2025-01-28

## 2025-01-28 RX ORDER — LAMOTRIGINE 150 MG/1
150 TABLET ORAL NIGHTLY
Qty: 30 TABLET | Refills: 0 | Status: SHIPPED | OUTPATIENT
Start: 2025-01-28

## 2025-03-14 DIAGNOSIS — F39 MOOD DISORDER: ICD-10-CM

## 2025-03-14 RX ORDER — LAMOTRIGINE 150 MG/1
150 TABLET ORAL NIGHTLY
Qty: 30 TABLET | Refills: 0 | Status: SHIPPED | OUTPATIENT
Start: 2025-03-14

## 2025-03-14 NOTE — TELEPHONE ENCOUNTER
lamoTRIgine 150 MG Oral Tablet     Last ordered: 1 month ago (1/28/2025) by DIANA Jensen     FOLLOW UP 03/24/2025

## 2025-03-18 DIAGNOSIS — F33.1 MODERATE EPISODE OF RECURRENT MAJOR DEPRESSIVE DISORDER: ICD-10-CM

## 2025-04-07 DIAGNOSIS — F39 MOOD DISORDER: ICD-10-CM

## 2025-04-07 RX ORDER — LAMOTRIGINE 150 MG/1
150 TABLET ORAL NIGHTLY
Qty: 30 TABLET | Refills: 0 | Status: SHIPPED | OUTPATIENT
Start: 2025-04-07

## 2025-04-07 NOTE — TELEPHONE ENCOUNTER
NEXT VISIT WITH PROVIDER   Appointment with Deneen Garduno APRN (04/29/2025)     LAST SEEN BY PROVIDER   Office Visit with Deneen Garduno APRN (01/28/2025)     LAST MED REFILL  lamoTRIgine (LaMICtal) 150 MG tablet (03/14/2025)     PROVIDER PLEASE ADVISE

## 2025-04-28 NOTE — PROGRESS NOTES
"[facility]  Follow-up Visit    Chief Complaint: \"last year has been one of the worst years I've ever had\"     History of Present Illness: Isai Carlos is a 44 y.o. male who presents today for initial evaluation regarding psychiatric consultation. Isai presents unaccompanied in no acute distress and engages with me appropriately. Psychotropic regimen with which patient presents is described as cariprazine 1.5 mg po q day, and venlafaxine XR 75 mg po q day. He has not noticed any major changes in mood, cariprazine rather new.      History is positive for signs/symptoms suggestive of history of significant trauma ( tours [place]  and [place])for which there are related intrusion symptoms related to the traumatic event (distressing memories, flashbacks, nightmares, intense distress associated with triggering stimuli, marked physiological reactions to triggering stimuli), persistent avoidance of triggering stimuli, negative alterations in cognition and mood (memory lapses, negative schemas, distorted cognitions about the event, social withdrawal, feelings of detachment/estrangement, persistent anhedonia), and marked alterations in arousal and reactivity (irritability, reckless behavior, hypervigilance, exaggerated startle, sleep disturbances).   Isai has a history of early exposure to enduring trauma associated with re-experiencing trauma, avoidance, hyperarousal (PTSD) and difficulty managing emotions, negative self-view, relationship difficulties, dissociative symptoms, and demoralization (complex PTSD).      He has a hx of low mood, low energy, anhedonia, changes in sleep, changes in appetite, guilt, poor concentration, psychomotor changes, thoughts of being better off dead      Psychiatric screening is negative for pathognomonic history of: psychosis, vivian, .     I have counseled the patient with regard to diagnoses and the recommended treatment regimen as " "documented below: I will assume prescriptive responsibility for Vraylar 1.5 mg po q day, Venlafaxine XR 75 mg po q day, Lamotrigine 25 mg po ramp up, and hydroxyzine 10 mg po TID anxiety. We will begin lamotrigine for mood instability and have reviewed the titration schedule as well as the signs and risk of SJS in addition to other more common SE including dizziness, sedation, and benign rash.       I will begin hydroxyzine PRN anxiety; patient has been advised this medication can elicit sedation and dizziness and should not be taken prior to driving or operating machinery Patient acknowledges the diagnoses per my rendered interpretation. Patient demonstrates awareness/understanding of viable alternatives for treatment as well as potential risks, benefits, and side effects associated with this regimen and is amenable to proceed in this fashion.      Recommended lifestyle changes: 30 minutes of activity to increase HR 2-3 days weekly.     Psychiatric History:  Diagnoses: depression  Outpatient history: was in therapy but lost insurance   Inpatient history: none  Medication trials: Trintellix, bupropion, effexor, paroxetine, vraylar, fluoxetine   Other treatment modalities: Psychotherapy  Self harm: no  Suicide attempts: none, thoughts-persistent passive Si, \"I think no one would notice if I was not here\"  Abuse or neglect: emotional     Substance Abuse History:   Types/methods/frequency: *heavy drinking in college  Transtheoretical stage: no issues   Occasional cannabis  Social History:  Residence: lives house with godfather and his family  Vocation: yes  Source of income: selling [employer]  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  Caffeine intake: half a pot of coffee  Hydration habits: no pertinent issues    history: Yes, two tours        Interval History Isai is a guarded 45 y.o. male who presents today for follow-up. Isai presents unaccompanied in no acute distress and engages with me appropriately. Last seen 1/2025.   Current treatment regimen includes:   - cariprazine 3 mg po q day  Lamotrigine 150 mg po q HS    Side-effects per given history: none.      Today the patient feels \"alright I suppose.\" He reports he is working. 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 He reports that he is interested in therapy but does not have the time to make the commitment. He reports that he is depressed, but denies having suicidal thoughts.      Thought process and content are devoid of overt aberration suggestive of acute vivian/psychosis. The patient denies SI/HI/AVH. There are changes on exam today compared to most recent evaluation.    - sleep: no concerns  - appetite: moderately controlled      I will be prescribing sertraline for mood support. Patient has been advised that SRIs may take up to 6-8 weeks for full effect and have a possibility of exacerbating mood/anxiety issues for which they are prescribed to the degree that, in some cases, their use may lead to acute suicidality. Patient contracts for safety appropriately. More common SE - sexual dysfunction, GI upset, tremors - were also reviewed. Patient was advised of potential for development of serotonin syndrome (as well as warning signs for onset) with concomitant use of multiple serotonergic agents and to be sure their health providers are aware this medication is being taken to mitigate this risk. Recommended that patient consider the addition of psychotherapy to his treatment plan, patient declines referral at this time due to work schedule.     I have counseled the patient with regard to diagnoses and the " recommended treatment regimen as documented below. Patient acknowledges the diagnoses per my rendered interpretation. Patient demonstrates understanding of potential risks/benefits/side effects associated with this regimen and is amenable to proceed in this fashion.       Social History     Socioeconomic History    Marital status: Single   Tobacco Use    Smoking status: Every Day     Current packs/day: 1.00     Average packs/day: 1 pack/day for 24.3 years (24.3 ttl pk-yrs)     Types: Cigarettes     Start date: 2001    Smokeless tobacco: Never   Vaping Use    Vaping status: Never Used   Substance and Sexual Activity    Alcohol use: Not Currently    Drug use: Yes     Types: Marijuana    Sexual activity: Not Currently       Tobacco use counseling/intervention: patient has been counseled with regard to risks of tobacco use and encouraged to consider quitting, with or without the use of adjunctive medication. Currently Precontemplation by transtheoretical model.     Problem List:  Patient Active Problem List   Diagnosis    Cigarette nicotine dependence without complication    Episode of recurrent major depressive disorder    Class 1 obesity without serious comorbidity with body mass index (BMI) of 30.0 to 30.9 in adult     Allergy:   No Known Allergies     Discontinued Medications:  There are no discontinued medications.    Current Medications:   Current Outpatient Medications   Medication Sig Dispense Refill    Cariprazine HCl (VRAYLAR) 3 MG capsule capsule Take 1 capsule by mouth Daily for 90 days. 30 capsule 2    lamoTRIgine (LaMICtal) 150 MG tablet TAKE 1 TABLET BY MOUTH ONCE DAILY AT NIGHT 30 tablet 0    venlafaxine (EFFEXOR) 37.5 MG tablet Take daily for two weeks, then take one dose po  every other day for 7 days, then take one dose every two days then STOP medication (Patient not taking: Reported on 4/29/2025) 30 tablet 0     No current facility-administered medications for this visit.     Past Medical  "History:  Past Medical History:   Diagnosis Date    Anxiety     Depression     Panic disorder      Past Surgical History:  Past Surgical History:   Procedure Laterality Date    NO PAST SURGERIES         MENTAL STATUS EXAM   General Appearance:  Cleanly groomed and dressed and well developed  Eye Contact:  Good eye contact  Attitude:  Cooperative and candid  Motor Activity:  Normal gait, posture  Muscle Strength:  Normal  Speech:  Normal rate, tone, volume, monotone and soft spoken  Mood and affect:  Flat  Thought Process:  Logical and goal-directed  Associations/ Thought Content:  No delusions  Hallucinations:  None  Suicidal Ideations:  Not present  Homicidal Ideation:  Not present  Sensorium:  Alert and clear  Orientation:  Person, place, time and situation  Immediate Recall, Recent, and Remote Memory:  Intact  Attention Span/ Concentration:  Good  Fund of Knowledge:  Appropriate for age and educational level  Intellectual Functioning:  Average range  Insight:  Fair  Judgement:  Fair  Reliability:  Good  Impulse Control:  Good      Vital Signs:   /66   Pulse 74   Ht 185.4 cm (73\")   Wt 102 kg (224 lb 6.4 oz)   BMI 29.61 kg/m²    Lab Results:   No visits with results within 6 Month(s) from this visit.   Latest known visit with results is:   Lab on 06/06/2024   Component Date Value Ref Range Status    Glucose 06/06/2024 110 (H)  65 - 99 mg/dL Final    BUN 06/06/2024 11  6 - 20 mg/dL Final    Creatinine 06/06/2024 0.90  0.76 - 1.27 mg/dL Final    Sodium 06/06/2024 140  136 - 145 mmol/L Final    Potassium 06/06/2024 4.5  3.5 - 5.2 mmol/L Final    Chloride 06/06/2024 103  98 - 107 mmol/L Final    CO2 06/06/2024 27.2  22.0 - 29.0 mmol/L Final    Calcium 06/06/2024 9.4  8.6 - 10.5 mg/dL Final    Total Protein 06/06/2024 7.0  6.0 - 8.5 g/dL Final    Albumin 06/06/2024 4.3  3.5 - 5.2 g/dL Final    ALT (SGPT) 06/06/2024 20  1 - 41 U/L Final    AST (SGOT) 06/06/2024 18  1 - 40 U/L Final    Alkaline Phosphatase " 06/06/2024 52  39 - 117 U/L Final    Total Bilirubin 06/06/2024 0.6  0.0 - 1.2 mg/dL Final    Globulin 06/06/2024 2.7  gm/dL Final    A/G Ratio 06/06/2024 1.6  g/dL Final    BUN/Creatinine Ratio 06/06/2024 12.2  7.0 - 25.0 Final    Anion Gap 06/06/2024 9.8  5.0 - 15.0 mmol/L Final    eGFR 06/06/2024 108.0  >60.0 mL/min/1.73 Final    Total Cholesterol 06/06/2024 176  0 - 200 mg/dL Final    Triglycerides 06/06/2024 115  0 - 150 mg/dL Final    HDL Cholesterol 06/06/2024 41  40 - 60 mg/dL Final    LDL Cholesterol  06/06/2024 114 (H)  0 - 100 mg/dL Final    VLDL Cholesterol 06/06/2024 21  5 - 40 mg/dL Final    LDL/HDL Ratio 06/06/2024 2.73   Final    TSH 06/06/2024 0.518  0.270 - 4.200 uIU/mL Final    WBC 06/06/2024 6.84  3.40 - 10.80 10*3/mm3 Final    RBC 06/06/2024 5.03  4.14 - 5.80 10*6/mm3 Final    Hemoglobin 06/06/2024 15.5  13.0 - 17.7 g/dL Final    Hematocrit 06/06/2024 47.1  37.5 - 51.0 % Final    MCV 06/06/2024 93.6  79.0 - 97.0 fL Final    MCH 06/06/2024 30.8  26.6 - 33.0 pg Final    MCHC 06/06/2024 32.9  31.5 - 35.7 g/dL Final    RDW 06/06/2024 12.5  12.3 - 15.4 % Final    RDW-SD 06/06/2024 43.3  37.0 - 54.0 fl Final    MPV 06/06/2024 12.1 (H)  6.0 - 12.0 fL Final    Platelets 06/06/2024 175  140 - 450 10*3/mm3 Final    Neutrophil % 06/06/2024 51.8  42.7 - 76.0 % Final    Lymphocyte % 06/06/2024 33.3  19.6 - 45.3 % Final    Monocyte % 06/06/2024 8.6  5.0 - 12.0 % Final    Eosinophil % 06/06/2024 5.1  0.3 - 6.2 % Final    Basophil % 06/06/2024 0.9  0.0 - 1.5 % Final    Immature Grans % 06/06/2024 0.3  0.0 - 0.5 % Final    Neutrophils, Absolute 06/06/2024 3.54  1.70 - 7.00 10*3/mm3 Final    Lymphocytes, Absolute 06/06/2024 2.28  0.70 - 3.10 10*3/mm3 Final    Monocytes, Absolute 06/06/2024 0.59  0.10 - 0.90 10*3/mm3 Final    Eosinophils, Absolute 06/06/2024 0.35  0.00 - 0.40 10*3/mm3 Final    Basophils, Absolute 06/06/2024 0.06  0.00 - 0.20 10*3/mm3 Final    Immature Grans, Absolute 06/06/2024 0.02  0.00 - 0.05  10*3/mm3 Final    [facility] 06/06/2024 0.0  0.0 - 0.2 /100 WBC Final       ASSESSMENT AND PLAN:    ICD-10-CM ICD-9-CM   1. Mood disorder  F39 296.90   2. Post traumatic stress disorder (PTSD)  F43.10 309.81   3. Complex posttraumatic stress disorder  F43.10 309.81   4. Panic attack  F41.0 300.01       Isai is a 45 y.o. male who presents today for follow-up regarding medication check. We have discussed the interval history and the treatment plan below, including potential R/B/SE of the recommended regimen of which the patient demonstrates understanding. Patient is agreeable to call 911 or go to the nearest ER should he become concerned for his own safety and/or the safety of those around him. There are are no overt indices of acute vivian/psychosis on exam today. EMBER reviewed and is as expected.    Medication regimen:   Begin sertraline 25 mg (1/2 tab) po daily for one week, then take 50 mg po q day  Continue cariprazine 3 mg po q day  Continue Lamotrigine 150 mg po q HS; patient is advised not to misuse prescribed medications or to use them with any exogenous substances that aren't disclosed to this provider as they may interact with the regimen to the patient's detriment.   Risk Assessment: protracted risk is moderate-severe, imminent risk is moderate.  Do note that this is subject to change with the Anglican of new stressors, treatment non-adherence, use of substances, and/or new medical ails.   Monitoring: reviewed labs/imaging as populated above; ordered  Therapy: deferred, declined, but recommended  Follow-up: to be determined, patient will call to set up follow up   Communications: N/A    TREATMENT PLAN/GOALS: challenge patterns of living conducive to symptom burden, implement recommended regimen as above with augmentative, intermittent supportive psychotherapy to reduce symptom burden. Patient acknowledged and verbally consented to continue treatment. The importance of adherence to the recommended treatment and  interval follow-up appointments was again emphasized today: patient has fair treatment adherence per given history. Patient was today reminded to limit daily caffeine intake, hydrate appropriately, eat healthy and nutritious foods, engage sleep hygiene measures, engage appropriate exposure to sunlight, engage with hobbies in balance with life necessities, and exercise appropriate to their capacity to do so.       Parts of this note are electronic transcriptions/translations of spoken language to printed text using the Dragon Dictation system.    Electronically signed by DIANA Jensen, 04/28/25

## 2025-04-29 ENCOUNTER — OFFICE VISIT (OUTPATIENT)
Dept: PSYCHIATRY | Facility: CLINIC | Age: 45
End: 2025-04-29
Payer: COMMERCIAL

## 2025-04-29 VITALS
SYSTOLIC BLOOD PRESSURE: 116 MMHG | HEIGHT: 73 IN | DIASTOLIC BLOOD PRESSURE: 66 MMHG | WEIGHT: 224.4 LBS | BODY MASS INDEX: 29.74 KG/M2 | HEART RATE: 74 BPM

## 2025-04-29 DIAGNOSIS — F43.10 COMPLEX POSTTRAUMATIC STRESS DISORDER: ICD-10-CM

## 2025-04-29 DIAGNOSIS — F39 MOOD DISORDER: Primary | ICD-10-CM

## 2025-04-29 DIAGNOSIS — F41.0 PANIC ATTACK: ICD-10-CM

## 2025-04-29 DIAGNOSIS — F43.10 POST TRAUMATIC STRESS DISORDER (PTSD): ICD-10-CM

## 2025-04-29 PROCEDURE — 99214 OFFICE O/P EST MOD 30 MIN: CPT

## 2025-05-02 DIAGNOSIS — F39 MOOD DISORDER: ICD-10-CM

## 2025-05-02 RX ORDER — LAMOTRIGINE 150 MG/1
150 TABLET ORAL NIGHTLY
Qty: 30 TABLET | Refills: 2 | Status: SHIPPED | OUTPATIENT
Start: 2025-05-02

## 2025-05-02 NOTE — TELEPHONE ENCOUNTER
REFILL REQUEST:     lamoTRIgine (LaMICtal) 150 MG tablet (04/07/2025)     F/UP- NOT CURRENTLY SCHEDULED.  LOV: 04/29/2025.

## 2025-06-15 DIAGNOSIS — F33.1 MODERATE EPISODE OF RECURRENT MAJOR DEPRESSIVE DISORDER: ICD-10-CM

## 2025-06-16 NOTE — TELEPHONE ENCOUNTER
ATTEMPTED TO CONTACT PT(PATIENT)      NO ANSWER      LEFT VOICEMAIL WITH INSTRUCTIONS TO RETURN CALL TO OFFICE AT [phone]

## 2025-06-16 NOTE — TELEPHONE ENCOUNTER
NEXT VISIT WITH PROVIDER   NONE IN [facility]     LAST SEEN BY PROVIDER   Office Visit with Deneen Garduno APRN (04/29/2025)     LAST MED REFILL  Cariprazine HCl (VRAYLAR) 3 MG capsule capsule (03/18/2025)

## 2025-06-17 NOTE — TELEPHONE ENCOUNTER
ATTEMPTED TO CONTACT PT(PATIENT)      NO ANSWER      LEFT VOICEMAIL WITH INSTRUCTIONS TO RETURN CALL TO OFFICE AT [phone]

## 2025-06-19 ENCOUNTER — OFFICE VISIT (OUTPATIENT)
Dept: PSYCHIATRY | Facility: CLINIC | Age: 45
End: 2025-06-19
Payer: COMMERCIAL

## 2025-06-19 VITALS
BODY MASS INDEX: 29.34 KG/M2 | DIASTOLIC BLOOD PRESSURE: 65 MMHG | HEART RATE: 79 BPM | SYSTOLIC BLOOD PRESSURE: 116 MMHG | HEIGHT: 73 IN | WEIGHT: 221.4 LBS

## 2025-06-19 DIAGNOSIS — F33.0 MDD (MAJOR DEPRESSIVE DISORDER), RECURRENT EPISODE, MILD: Primary | ICD-10-CM

## 2025-06-19 DIAGNOSIS — F43.10 POST TRAUMATIC STRESS DISORDER (PTSD): ICD-10-CM

## 2025-06-19 DIAGNOSIS — F43.10 COMPLEX POSTTRAUMATIC STRESS DISORDER: ICD-10-CM

## 2025-06-19 DIAGNOSIS — F39 MOOD DISORDER: ICD-10-CM

## 2025-06-19 RX ORDER — LAMOTRIGINE 150 MG/1
150 TABLET ORAL NIGHTLY
Qty: 30 TABLET | Refills: 2 | Status: SHIPPED | OUTPATIENT
Start: 2025-06-19 | End: 2025-06-23 | Stop reason: SDUPTHER

## 2025-06-19 RX ORDER — LAMOTRIGINE 150 MG/1
150 TABLET ORAL NIGHTLY
Qty: 30 TABLET | Refills: 2 | Status: SHIPPED | OUTPATIENT
Start: 2025-06-19 | End: 2025-06-19

## 2025-06-19 NOTE — PROGRESS NOTES
"[facility]  Follow-up Visit    Chief Complaint: \"last year has been one of the worst years I've ever had\"     History of Present Illness: Isai Carlos is a 44 y.o. male who presents today for initial evaluation regarding psychiatric consultation. Isai presents unaccompanied in no acute distress and engages with me appropriately. Psychotropic regimen with which patient presents is described as cariprazine 1.5 mg po q day, and venlafaxine XR 75 mg po q day. He has not noticed any major changes in mood, cariprazine rather new.      History is positive for signs/symptoms suggestive of history of significant trauma ( tours [place]  and [place])for which there are related intrusion symptoms related to the traumatic event (distressing memories, flashbacks, nightmares, intense distress associated with triggering stimuli, marked physiological reactions to triggering stimuli), persistent avoidance of triggering stimuli, negative alterations in cognition and mood (memory lapses, negative schemas, distorted cognitions about the event, social withdrawal, feelings of detachment/estrangement, persistent anhedonia), and marked alterations in arousal and reactivity (irritability, reckless behavior, hypervigilance, exaggerated startle, sleep disturbances).   Isai has a history of early exposure to enduring trauma associated with re-experiencing trauma, avoidance, hyperarousal (PTSD) and difficulty managing emotions, negative self-view, relationship difficulties, dissociative symptoms, and demoralization (complex PTSD).      He has a hx of low mood, low energy, anhedonia, changes in sleep, changes in appetite, guilt, poor concentration, psychomotor changes, thoughts of being better off dead      Psychiatric screening is negative for pathognomonic history of: psychosis, vivian, .     I have counseled the patient with regard to diagnoses and the recommended treatment regimen as " "documented below: I will assume prescriptive responsibility for Vraylar 1.5 mg po q day, Venlafaxine XR 75 mg po q day, Lamotrigine 25 mg po ramp up, and hydroxyzine 10 mg po TID anxiety. We will begin lamotrigine for mood instability and have reviewed the titration schedule as well as the signs and risk of SJS in addition to other more common SE including dizziness, sedation, and benign rash.       I will begin hydroxyzine PRN anxiety; patient has been advised this medication can elicit sedation and dizziness and should not be taken prior to driving or operating machinery Patient acknowledges the diagnoses per my rendered interpretation. Patient demonstrates awareness/understanding of viable alternatives for treatment as well as potential risks, benefits, and side effects associated with this regimen and is amenable to proceed in this fashion.      Recommended lifestyle changes: 30 minutes of activity to increase HR 2-3 days weekly.     Psychiatric History:  Diagnoses: depression  Outpatient history: was in therapy but lost insurance   Inpatient history: none  Medication trials: Trintellix, bupropion, effexor, paroxetine, vraylar  Other treatment modalities: Psychotherapy  Self harm: no  Suicide attempts: none, thoughts-persistent passive Si, \"I think no one would notice if I was not here\"  Abuse or neglect: emotional     Substance Abuse History:   Types/methods/frequency: *heavy drinking in college  Transtheoretical stage: no issues   Occasional cannabis  Social History:  Residence: lives house with godfather and his family  Vocation: yes  Source of income: selling mattresses  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  Caffeine intake: half a pot of coffee  Hydration habits: no pertinent issues    history: Yes, two tours        Interval History Isai is a 45 y.o. male who presents today for follow-up. Isai presents unaccompanied in no acute distress and engages with me appropriately.   Current treatment regimen includes:   - cariprazine 3 mg po q day  -lamotrigine 150 mg po q HS  Sertraline 50 mg po q day   Side-effects per given history: none.      Today the patient feels \"tired\" he reports finishing 14 consecutive 10 hour days. He reports he gets the weekend. He endorses that they have hired someone to help. Mood has been about the same-\"getting through\" He enjoys re listening to pod casts and history audio books. He reports cutting back on marijuana use. He voices that his medication is at a good place. He has a kyaking trip planned, then a trip to [place].  Thought process and content are devoid of overt aberration suggestive of acute vivian/psychosis. The patient denies SI/HI/AVH. There are not changes on exam today compared to most recent evaluation.    - sleep: no concerns  - appetite: moderately controlled    Patient is keen to continue current regimen. We will continue current regimen. In regard to risk, patient denies SI, he is cooperative with care, has growing support system (friends) and is future oriented (follow up scheduled).   I have counseled the patient with regard to diagnoses and the recommended treatment regimen as documented below. Patient acknowledges the diagnoses per my rendered interpretation. Patient demonstrates understanding of potential risks/benefits/side effects associated with this regimen and is amenable to proceed in this fashion.       Social History     Socioeconomic History    Marital status: Single   Tobacco Use    Smoking status: Every Day     Current packs/day: 1.00     Average packs/day: 1 pack/day for 24.5 years (24.5 ttl pk-yrs)     Types: Cigarettes     " Start date: 2001    Smokeless tobacco: Never   Vaping Use    Vaping status: Never Used   Substance and Sexual Activity    Alcohol use: Not Currently    Drug use: Yes     Types: Marijuana    Sexual activity: Not Currently       Tobacco use counseling/intervention: patient has been counseled with regard to risks of tobacco use and encouraged to consider quitting, with or without the use of adjunctive medication. Currently Precontemplation by transtheoretical model.     Problem List:  Patient Active Problem List   Diagnosis    Cigarette nicotine dependence without complication    Episode of recurrent major depressive disorder    Class 1 obesity without serious comorbidity with body mass index (BMI) of 30.0 to 30.9 in adult     Allergy:   No Known Allergies     Discontinued Medications:  Medications Discontinued During This Encounter   Medication Reason    sertraline (Zoloft) 50 MG tablet Reorder    lamoTRIgine (LaMICtal) 150 MG tablet Reorder    Cariprazine HCl (Vraylar) 3 MG capsule capsule Reorder    lamoTRIgine (LaMICtal) 150 MG tablet        Current Medications:   Current Outpatient Medications   Medication Sig Dispense Refill    Cariprazine HCl (Vraylar) 3 MG capsule capsule Take 1 capsule by mouth Daily for 90 days. 30 capsule 2    lamoTRIgine (LaMICtal) 150 MG tablet Take 1 tablet by mouth Every Night. 30 tablet 2    sertraline (Zoloft) 50 MG tablet Take 1 tablet by mouth Daily for 90 days. Take 1/2 tablet by mouth daily for 7 days, then increase to taking one tablet daily 30 tablet 2     No current facility-administered medications for this visit.     Past Medical History:  Past Medical History:   Diagnosis Date    Anxiety     Depression     Panic disorder      Past Surgical History:  Past Surgical History:   Procedure Laterality Date    NO PAST SURGERIES         MENTAL STATUS EXAM   General Appearance:  Cleanly groomed and dressed and well developed  Eye Contact:  Good eye contact  Attitude:  Cooperative,  "candid and polite  Motor Activity:  Normal gait, posture  Muscle Strength:  Normal  Speech:  Normal rate, tone, volume  Language:  Spontaneous  Mood and affect:  Normal, pleasant  Thought Process:  Logical and goal-directed  Associations/ Thought Content:  No delusions  Hallucinations:  None  Suicidal Ideations:  Not present  Homicidal Ideation:  Not present  Sensorium:  Alert and clear  Orientation:  Person, place, time and situation  Immediate Recall, Recent, and Remote Memory:  Intact  Attention Span/ Concentration:  Good  Fund of Knowledge:  Appropriate for age and educational level  Intellectual Functioning:  Average range  Insight:  Good  Judgement:  Good  Reliability:  Good  Impulse Control:  Good      Vital Signs:   /65   Pulse 79   Ht 185.4 cm (73\")   Wt 100 kg (221 lb 6.4 oz)   BMI 29.21 kg/m²    Lab Results:   No visits with results within 6 Month(s) from this visit.   Latest known visit with results is:   Lab on 06/06/2024   Component Date Value Ref Range Status    Glucose 06/06/2024 110 (H)  65 - 99 mg/dL Final    BUN 06/06/2024 11  6 - 20 mg/dL Final    Creatinine 06/06/2024 0.90  0.76 - 1.27 mg/dL Final    Sodium 06/06/2024 140  136 - 145 mmol/L Final    Potassium 06/06/2024 4.5  3.5 - 5.2 mmol/L Final    Chloride 06/06/2024 103  98 - 107 mmol/L Final    CO2 06/06/2024 27.2  22.0 - 29.0 mmol/L Final    Calcium 06/06/2024 9.4  8.6 - 10.5 mg/dL Final    Total Protein 06/06/2024 7.0  6.0 - 8.5 g/dL Final    Albumin 06/06/2024 4.3  3.5 - 5.2 g/dL Final    ALT (SGPT) 06/06/2024 20  1 - 41 U/L Final    AST (SGOT) 06/06/2024 18  1 - 40 U/L Final    Alkaline Phosphatase 06/06/2024 52  39 - 117 U/L Final    Total Bilirubin 06/06/2024 0.6  0.0 - 1.2 mg/dL Final    Globulin 06/06/2024 2.7  gm/dL Final    A/G Ratio 06/06/2024 1.6  g/dL Final    BUN/Creatinine Ratio 06/06/2024 12.2  7.0 - 25.0 Final    Anion Gap 06/06/2024 9.8  5.0 - 15.0 mmol/L Final    eGFR 06/06/2024 108.0  >60.0 mL/min/1.73 Final    " Total Cholesterol 06/06/2024 176  0 - 200 mg/dL Final    Triglycerides 06/06/2024 115  0 - 150 mg/dL Final    HDL Cholesterol 06/06/2024 41  40 - 60 mg/dL Final    LDL Cholesterol  06/06/2024 114 (H)  0 - 100 mg/dL Final    VLDL Cholesterol 06/06/2024 21  5 - 40 mg/dL Final    LDL/HDL Ratio 06/06/2024 2.73   Final    TSH 06/06/2024 0.518  0.270 - 4.200 uIU/mL Final    WBC 06/06/2024 6.84  3.40 - 10.80 10*3/mm3 Final    RBC 06/06/2024 5.03  4.14 - 5.80 10*6/mm3 Final    Hemoglobin 06/06/2024 15.5  13.0 - 17.7 g/dL Final    Hematocrit 06/06/2024 47.1  37.5 - 51.0 % Final    MCV 06/06/2024 93.6  79.0 - 97.0 fL Final    MCH 06/06/2024 30.8  26.6 - 33.0 pg Final    MCHC 06/06/2024 32.9  31.5 - 35.7 g/dL Final    RDW 06/06/2024 12.5  12.3 - 15.4 % Final    RDW-SD 06/06/2024 43.3  37.0 - 54.0 fl Final    MPV 06/06/2024 12.1 (H)  6.0 - 12.0 fL Final    Platelets 06/06/2024 175  140 - 450 10*3/mm3 Final    Neutrophil % 06/06/2024 51.8  42.7 - 76.0 % Final    Lymphocyte % 06/06/2024 33.3  19.6 - 45.3 % Final    Monocyte % 06/06/2024 8.6  5.0 - 12.0 % Final    Eosinophil % 06/06/2024 5.1  0.3 - 6.2 % Final    Basophil % 06/06/2024 0.9  0.0 - 1.5 % Final    Immature Grans % 06/06/2024 0.3  0.0 - 0.5 % Final    Neutrophils, Absolute 06/06/2024 3.54  1.70 - 7.00 10*3/mm3 Final    Lymphocytes, Absolute 06/06/2024 2.28  0.70 - 3.10 10*3/mm3 Final    Monocytes, Absolute 06/06/2024 0.59  0.10 - 0.90 10*3/mm3 Final    Eosinophils, Absolute 06/06/2024 0.35  0.00 - 0.40 10*3/mm3 Final    Basophils, Absolute 06/06/2024 0.06  0.00 - 0.20 10*3/mm3 Final    Immature Grans, Absolute 06/06/2024 0.02  0.00 - 0.05 10*3/mm3 Final    nRBC 06/06/2024 0.0  0.0 - 0.2 /100 WBC Final       ASSESSMENT AND PLAN:    ICD-10-CM ICD-9-CM   1. Post traumatic stress disorder (PTSD)  F43.10 309.81   2. MDD (major depressive disorder), recurrent episode, mild  F33.0 296.31   3. Mood disorder  F39 296.90   4. Complex posttraumatic stress disorder  F43.10 309.81        Isai is a 45 y.o. male who presents today for follow-up regarding medication response. We have discussed the interval history and the treatment plan below, including potential R/B/SE of the recommended regimen of which the patient demonstrates understanding. Patient is agreeable to call 911 or go to the nearest ER should he become concerned for his own safety and/or the safety of those around him. There are are no overt indices of acute vivian/psychosis on exam today. EMBER reviewed and is as expected.    Medication regimen:   Continue cariprazine 3 mg po q day  Continue lamotrigine 150 mg po q HS  Continue Sertraline 50 mg po q day   The patient is advised not to misuse prescribed medications or to use them with any exogenous substances that aren't disclosed to this provider as they may interact with the regimen to the patient's detriment.   Risk Assessment: protracted risk is moderate-severe, imminent risk is moderate-low. Do note that this is subject to change with the [employer] of new stressors, treatment non-adherence, use of substances, and/or new medical ails.   Monitoring: reviewed labs/imaging as populated above; ordered  Therapy: deferred, declined, but recommended-financial strain  Follow-up: 3 months  Communications: N/A    TREATMENT PLAN/GOALS: challenge patterns of living conducive to symptom burden, implement recommended regimen as above with augmentative, intermittent supportive psychotherapy to reduce symptom burden. Patient acknowledged and verbally consented to continue treatment. The importance of adherence to the recommended treatment and interval follow-up appointments was again emphasized today: patient has good treatment adherence per given history. Patient was today reminded to limit daily caffeine intake, hydrate appropriately, eat healthy and nutritious foods, engage sleep hygiene measures, engage appropriate exposure to sunlight, engage with hobbies in balance with life necessities,  and exercise appropriate to their capacity to do so.       Parts of this note are electronic transcriptions/translations of spoken language to printed text using the Dragon Dictation system.    Electronically signed by DIANA Jensen, 06/19/25

## 2025-06-23 DIAGNOSIS — F33.0 MDD (MAJOR DEPRESSIVE DISORDER), RECURRENT EPISODE, MILD: ICD-10-CM

## 2025-06-23 RX ORDER — LAMOTRIGINE 150 MG/1
150 TABLET ORAL NIGHTLY
Qty: 30 TABLET | Refills: 2 | Status: SHIPPED | OUTPATIENT
Start: 2025-06-23

## 2025-06-23 NOTE — TELEPHONE ENCOUNTER
PATIENT LEFT VOICEMAIL REQUESTING MEDICATION REFILLS, PATIENT STATES HE LOST HIS MEDICATIONS DURING A TRIP     MEDS PENDED